# Patient Record
Sex: FEMALE | Race: BLACK OR AFRICAN AMERICAN
[De-identification: names, ages, dates, MRNs, and addresses within clinical notes are randomized per-mention and may not be internally consistent; named-entity substitution may affect disease eponyms.]

---

## 2022-11-22 ENCOUNTER — HOSPITAL ENCOUNTER (EMERGENCY)
Dept: HOSPITAL 97 - ER | Age: 47
Discharge: HOME | End: 2022-11-22
Payer: COMMERCIAL

## 2022-11-22 VITALS — OXYGEN SATURATION: 99 % | SYSTOLIC BLOOD PRESSURE: 144 MMHG | DIASTOLIC BLOOD PRESSURE: 110 MMHG | TEMPERATURE: 99 F

## 2022-11-22 DIAGNOSIS — S61.412A: Primary | ICD-10-CM

## 2022-11-22 PROCEDURE — 0JQK0ZZ REPAIR LEFT HAND SUBCUTANEOUS TISSUE AND FASCIA, OPEN APPROACH: ICD-10-PCS

## 2022-11-22 PROCEDURE — 12001 RPR S/N/AX/GEN/TRNK 2.5CM/<: CPT

## 2022-11-22 PROCEDURE — 99283 EMERGENCY DEPT VISIT LOW MDM: CPT

## 2022-11-22 NOTE — XMS REPORT
Continuity of Care Document

                          Created on:2022



Patient:TORO SMITH

Sex:Female

:1975

External Reference #:[id number]





Demographics







                          Address                   [address]



                                                    [address]



                                                    [place] [address]

 

                          Home Phone                [phone]

 

                          Work Phone                [phone]

 

                          Mobile Phone              [phone]

 

                          Email Address             NONE

 

                          Preferred Language        English

 

                          Marital Status            Unknown

 

                          Confucianism Affiliation     Unknown

 

                          Race                      Unknown

 

                          Additional Race(s)        Unavailable

 

                          Ethnic Group              Unknown









Author







                          Organization              [facility]

t

 

                          Address                   [address]  [address]



                                                    [place] [address]

 

                          Phone                     [phone]









Support







                Name            Relationship    Address         Phone

 

                EMPERATRIZ LEBLANC               [address] [phone]

[phone]



                                                [address]        



                                                [place] [address] 

 

                ASIF URIAS    [address]            [address]         [phone]



                                                [place], [place] 









Care Team Providers







                    Name                Role                Phone

 

                    ADAN BISWAS Primary Care Physician Unavailable

 

                    JAMES HESS Attending Clinician Unavailable

 

                    Only, Arnulfo Pob2 Test Attending Clinician Unavailable

 

                    James Hess DO Attending Clinician [phone]

 

                    Nurse, Adc Pob Immunization Attending Clinician Unavailable

 

                    Azra Kc Attending Clinician [phone]

 

                    AZRA GALVEZ Attending Clinician Unavailable

 

                    AZRA GALVEZ Admitting Clinician Unavailable









Payers







           Payer Name Policy Type Policy Number Effective Date Expiration Date S

ource

 

           [employer]            [id number] 2020            



                                            00:00:00              







Problems







       Condition Condition Condition Status Onset  Resolution Last   Treating Co

mments 

Source



       Name   Details Category        Date   Date   Treatment Clinician        



                                                 Date                 

 

       Other  Other  Disease Active 2016                             [facility]



       general general               2-06                               ity of



       counseling counseling               00:00:                             [place]

[place]



       and advice and advice               00                                 Me

dical



       for    for                                                     Branch



       contracept contracept                                                  



       santi    santi                                                     



       management management                                                  

 

       Elevated Elevated Disease Active 2016                             Unive

rs



       blood  blood                2-06                               ity of



       pressure pressure               00:00:                             [place]



       reading reading               00                                 Medical



       without without                                                  Branch



       diagnosis diagnosis                                                  



       of     of                                                      



       hypertensi hypertensi                                                  



       on     on                                                      

 

       Fibroids Fibroids Disease Active 2016                      Overview: Un

rox



                                   2-06                        Formattin ity of



                                   00:00:                      g of this [place]



                                   00                          note   Medical



                                                               might be Branch



                                                               different 



                                                               from the 



                                                               original. 



                                                               Reports 



                                                               diagnosed 



                                                               in , 



                                                               records 



                                                               requested 

 

       Anemia Anemia Disease Active                       Overview: Univer

s



                                                           Formattin ity of



                                   00:00:                      g of this [place]



                                   00                          note   Medical



                                                               might be Branch



                                                               different 



                                                               from the 



                                                               original. 



                                                               ICD10  



                                                               Diagnosis 



                                                               Term   



                                                               Replacer 



                                                               Utility 

 

       Well woman Well woman Disease Active                       Overview

: Univers



       exam   exam                                         Formattin ity of



                                   00:00:                      g of this [place]



                                   00                          note   Medical



                                                               might be Branch



                                                               different 



                                                               from the 



                                                               original. 



                                                               Pap    



                                                               smear- 



                                                               NIL with 



                                                               absent 



                                                               ECC.   



                                                               Repeat in 



                                                               1      



                                                               [id number] 



                                                               Diagnosis 



                                                               Term   



                                                               Replacer 



                                                               Utility 

 

       Morbid Morbid Disease Active                              [facility]



       obesity obesity                                              ity of



                                   00:00:                             [place]



                                   00                                 Medical



                                                                      Branch







Allergies, Adverse Reactions, Alerts







       Allergy Allergy Status Severity Reaction(s) Onset  Inactive Treating Comm

ents 

Source



       Name   Type                        Date   Date   Clinician        

 

       Codeine Propensi Active        Hives                        [facility]



              ty to                                               ity of



              adverse                      00:00:                      [place]



              reaction                      00                          Medical



              s                                                       Branch

 

       CODEINE DRUG   Active        Hives                        Univers



              INGREDI                                              ity of



                                          00:00:                      [place]



                                          00                          Medical



                                                                      Branch







Social History







           Social Habit Start Date Stop Date  Quantity   Comments   Source

 

           Exposure to                       Not sure              University 



           SARS-CoV-2                                             [place] [facility]



           (event)                                                Branch

 

           History SDOH                                             University o

f



           Alcohol Frequency                                             [facility]

edical



                                                                  Branch

 

           History SDOH                                             University o

f



           Alcohol Std                                             [place] [facility]



           Drinks                                                 Branch

 

           History SDOH                                             University o

f



           Alcohol Binge                                             [place] [facility]

al



                                                                  Branch

 

           Alcohol intake 2021 Current drinker of            Un

iversity of



                      00:00:00   00:00:00   alcohol (finding)            [facility]

edical



                                                                  Branch

 

           Tobacco use and 2014 Never used            Universit

y of



           exposure   00:00:00   00:00:00                         [facility]



                                                                  Branch

 

           Alcohol Comment 2014 occasionally            Univers

ity of



                      00:00:00   00:00:00                         [facility]

 

           Sex Assigned At [birth year]                       Universit

y of



           Birth      00:00:00   00:00:00                         [facility]









                Smoking Status  Start Date      Stop Date       Source

 

                Never smoker                                    [facility]







Medications







       Ordered Filled Start  Stop   Current Ordering Indication Dosage Frequency

 Signature

                    Comments            Components          Source



     Medication Medication Date Date Medication? Clinician                (SIG) 

          



     Name Name                                                   

 

     acetaminoph       No             1000mg      1,000 mg,         

  Univers



     en        3-04 03-03                          Oral,           ity of



     (TYLENOL)      00:45: 23:39                          ONCE, 1           [place]

s



     tablet      00   :00                           dose, Wed           Medical



     1,000 mg                                         3/3/21 at           Branch



                                                  1845,           



                                                  Routine           

 

     dexamethaso      2021- No             10mg      10 mg,           Uni

vers



     ne        3-04 03-03                          Intramuscu           ity of



     (DECADRON      00:30: 23:37                          lar, ONCE,           T

exas



     PHOSPHATE)      00   :00                           1 dose,           Medica

l



     injection                                         Wed 3/3/21           Bran

ch



     10 mg                                         at 1830,           



                                                  STAT           

 

     ketorolac      2021- No             30mg      30 mg,           Unive

rs



     (TORADOL)      3-03 03-03                          Intramuscu           ity

 of



     injection      23:30: 22:32                          lar, ONCE,           T

exas



     30 mg      00   :00                           1 dose,           Medical



                                                  Wed 3/3/21           Branch



                                                  at 1730,           



                                                  ASAP<br>Fa           



                                                  culty           



                                                  member           



                                                  approving           



                                                  Restricted           



                                                  medication           



                                                  :              



                                                  AZRA GALVEZ           

 

     traMADoL 50            Yes       4647 50mg      Take 1           Univ

ers



     mg tablet      3-03                               tablet by           ity o

f



               00:00:                               mouth           [place]



               00                                 every 6           Medical



                                                  (six)           Branch



                                                  hours as           



                                                  needed for           



                                                  Pain           



                                                  (scale           



                                                  7-10).           



                                                  Indication           



                                                  s: acute           



                                                  pain           

 

     ibuprofen            Yes       [id number] 800mg      Take 1           U

nivers



     800 mg      3-03                               tablet by           ity of



     tablet      00:00:                               mouth           [place]



               00                                 every 6           Medical



                                                  (six)           Branch



                                                  hours as           



                                                  needed for           



                                                  Pain           



                                                  (scale           



                                                  4-6).           

 

     traMADoL 50            Yes       4647 50mg      Take 1           Univ

ers



     mg tablet      3-03                               tablet by           ity o

f



               00:00:                               mouth           [place]



               00                                 every 6           Medical



                                                  (six)           Branch



                                                  hours as           



                                                  needed for           



                                                  Pain           



                                                  (scale           



                                                  7-10).           



                                                  Indication           



                                                  s: acute           



                                                  pain           

 

     ibuprofen            Yes       [id number] 800mg      Take 1           U

nivers



     800 mg      3-03                               tablet by           ity of



     tablet      00:00:                               mouth           [place]



               00                                 every 6           Medical



                                                  (six)           Branch



                                                  hours as           



                                                  needed for           



                                                  Pain           



                                                  (scale           



                                                  4-6).           

 

     traMADoL 50            Yes       4647 50mg      Take 1           Univ

ers



     mg tablet      3-03                               tablet by           ity o

f



               00:00:                               mouth           [place]



               00                                 every 6           Medical



                                                  (six)           Branch



                                                  hours as           



                                                  needed for           



                                                  Pain           



                                                  (scale           



                                                  7-10).           



                                                  Indication           



                                                  s: acute           



                                                  pain           

 

     ibuprofen            Yes       [id number] 800mg      Take 1           U

nivers



     800 mg      3-03                               tablet by           ity of



     tablet      00:00:                               mouth           [place]



               00                                 every 6           Medical



                                                  (six)           Branch



                                                  hours as           



                                                  needed for           



                                                  Pain           



                                                  (scale           



                                                  4-6).           

 

     NUVARING      2016      Yes       [id number] 1{each}      Insert 1        

   [facility]



     (NUVARING)      2-06                               Each into           ity 

of



     0.12-0.015      00:00:                               vagina           [place]



     mg/24 hr      00                                 once every           Medic

al



     vaginal                                         month.           Branch



     insert                                         Insert           



                                                  vaginally           



                                                  and leave           



                                                  in place           



                                                  for 3           



                                                  consecutiv           



                                                  e weeks,           



                                                  then           



                                                  remove for           



                                                  1 week.           

 

     NUVARING      2016      Yes       [id number] 1{each}      Insert 1        

   [facility]



     (NUVARING)      2-06                               Each into           ity 

of



     0.12-0.015      00:00:                               vagina           [place]



     mg/24 hr      00                                 once every           Medic

al



     vaginal                                         month.           Branch



     insert                                         Insert           



                                                  vaginally           



                                                  and leave           



                                                  in place           



                                                  for 3           



                                                  consecutiv           



                                                  e weeks,           



                                                  then           



                                                  remove for           



                                                  1 week.           

 

     NUVARING      2016      Yes       [id number] 1{each}      Insert 1        

   [facility]



     (NUVARING)      2-06                               Each into           ity 

of



     0.12-0.015      00:00:                               vagina           [place]



     mg/24 hr      00                                 once every           Medic

al



     vaginal                                         month.           Branch



     insert                                         Insert           



                                                  vaginally           



                                                  and leave           



                                                  in place           



                                                  for 3           



                                                  consecutiv           



                                                  e weeks,           



                                                  then           



                                                  remove for           



                                                  1 week.           

 

     MULTIVITS,C            Yes            1{tbl}      Take 1 Tab         

  Univers



     A,MINERALS/      1-30                               by mouth           ity 

of



     IRON/FA      16:45:                               daily.           [place]



     (ONE-A-DAY      12                                                Medical



     WOMENS                                                        Branch



     FORMULA                                                        



     ORAL)                                                        

 

     omega-3            Yes            1g        Take 1 g           Univer

s



     fatty      1-30                               by mouth           ity of



     acids-vitam      16:45:                               daily.           [place]

s



     in E (FISH      12                                                Medical



     OIL) 1,000                                                        Branch



     mg capsule                                                        

 

     MULTIVITS,C            Yes            1{tbl}      Take 1 Tab         

  Univers



     A,MINERALS/      1-30                               by mouth           ity 

of



     IRON/FA      10:45:                               daily.           [place]



     (ONE-A-DAY      12                                                Medical



     WOMENS                                                        Branch



     FORMULA                                                        



     ORAL)                                                        

 

     omega-3            Yes            1g        Take 1 g           Univer

s



     fatty      1-30                               by mouth           ity of



     acids-vitam      10:45:                               daily.           [place]

[place]



     in E (FISH      12                                                Medical



     OIL) 1,000                                                        [place]



     mg capsule                                                        

 

     MULTIVITS,C            Yes            1{tbl}      Take 1 Tab         

  Univers



     A,MINERALS/      1-30                               by mouth           ity 

of



     IRON/FA      10:45:                               daily.           [place]



     (ONE-A-DAY      12                                                Medical



     WOMENS                                                        [place]



     FORMULA                                                        



     ORAL)                                                        

 

     omega-3            Yes            1g        Take 1 g           Univer

s



     fatty                                     by mouth           ity of



     acids-vitam      10:45:                               daily.           [place]

[place]



     in E (FISH      12                                                Medical



     OIL) 1,000                                                        [place]



     mg capsule                                                        







Immunizations







           Ordered    Filled Immunization Date       Status     Comments   [facility]

e



           Immunization Name Name                                        

 

           SARS-COV-2 COVID-19            2022 Completed             Unive

rsity of



           BRANDON/J&J VACCINE            00:00:00                         [facility]

 

           SARS-COV-2 COVID-19            2022 Completed             Unive

rsity of



           BRANDON/J&J VACCINE            00:00:00                         [facility]

 

           SARS-COV-2 COVID-19            2021 Completed             Unive

rsity of



           BRANDON/J&J VACCINE            00:00:00                         [facility]                  2014 Completed             [facility]



                                 00:00:00                         [facility] [place]                  2014 Completed             [facility]



                                 00:00:00                         [facility] [place]                  2014 Completed             [facility]



                                 00:00:00                         [facility]

 

           Td                    2003 Completed             [facility]



                                 00:00:00                         [facility]

 

           Td                    2003 Completed             [facility]



                                 00:00:00                         [facility]

 

           Td                    2003 Completed             [facility]



                                 00:00:00                         [facility]







Vital Signs







             Vital Name   Observation Time Observation Value Comments     Source

 

             Systolic blood 2021 00:06:46 142 mm[Hg]                Univer

sity of



             pressure                                            [facility]

 

             Diastolic blood 2021 00:06:46 94 mm[Hg]                 Unive

rsity of



             pressure                                            [facility]

 

             Heart rate   2021 00:05:40 80 /min                   [facility]

 

             Respiratory rate 2021 00:05:40 16 /min                   [facility]

 

             Oxygen saturation in 2021 00:05:40 99 /min                   

[facility]



             Arterial blood by                                        [place] [facility]

[place]



             Pulse oximetry                                        [place]

 

             Body temperature 2021 21:56:00 37 Zara                    [facility]

 

             Body weight  2021 21:56:00 99.791 kg                 [facility]

 

             BMI          2021 21:56:00 36.61 kg/m2               [facility]







Procedures







                Procedure       Date / Time Performed Performing Clinician Jason

e

 

                SARS-COV-2 COVID-19 2022 21:34:19 Doctor Unassigned, No Un

iversity of 

[place]



                VACCINE,0.5ML,IM                 Name            [facility]



                (BRANDON/J&J)                                   

 

                XR HIPS 2 VW LEFT 2021 23:00:50 Azra Galvez Univer

[facility]

 

                URINALYSIS      2021 22:28:00 Azra Galvez [facility]







Encounters







        Start   End     Encounter Admission Attending Care    Care    Encounter 

Source



        Date/Time Date/Time Type    Type    Clinicians Facility Department ID   

   

 

        2022 Outpatient ALCON HESS  [facility]    [id number]

[id number] Univers



        16:30:00 16:55:17                 JAMES boateng [facility]

 

        2022 Laboratory         Only, Arnulfo Pob2 Test [facility]    1.2

.840.114 [id number] 

Univers



        16:30:00 16:45:00 Only            James Hess 350.1.13

.10         ity [facility]



                                                [facility] [id number]         [place]

[place]



                                                PROFESSIO [phone]         Me

dical



                                                NAL     [address]             [facility]                 

 

        2022 Imm/Inj         Nurse, Arnulfo Pob Immunization [facility]  

  [id number] 

[id number]                                Univers



        15:40:00 15:40:00 Visit           James Hess 350.1.13

.10         ity [facility]



                                                [facility] [id number]         [place]

[place]



                                                PROFESSIO [phone]         Me

dical



                                                NAL     421             [facility]                 

 

        2022 Outpatient ALCON HESS  [facility]    [id number]

115 Univers



        15:40:00 15:31:14                 JAMES boateng [facility]

 

        2021 Emergency         Duglas [facility]    1.2.840.114 [id number]

[id number] Univers



        15:58:00 18:12:00                 Azra Preciado 350.1.13.10        

 ity of



                                                [place] [id number]         [facility]  [phone]         Medi

[place]



                                                        [address]             [place]anch

 

        2021 Emergency X       SANTI GALVEZ    ERT     [id number]

[id number] Univers



        15:58:00 18:12:00                 AZRA boateng [facility]







Results







           Test       Test       Test       Results    Result     Source



           Description Time       Comments              Comments   

 

           XR HIPS 2 VW 2021-              Impression: No evidence           

 University [place] [id number]                    of acute fracture or            [facility]



                      23:33:55              dislocation. RL: [id number]            [facility]



                                            Electronically signed by            



                                            Jeremiah Doan MD at            



                                            3/3/2021 5:33 PMClinical            



                                            indication: Left hip            



                                            pain. Ordering            



                                            Physician: AZRA GALVEZ Technique: The            



                                            frontal view the pelvis            



                                            and 2 additional views            



                                            of the lefthip were            



                                            obtained. ?There are no            



                                            prior pelvic or left hip            



                                            imaging studiesavailable            



                                            for comparison.            



                                            Findings: There is no            



                                            evidence of an acute            



                                            fracture or            



                                            dislocationinvolving the            



                                            bones of the pelvis with            



                                            particular attention to            



                                            the lefthip. ?There is            



                                            no radiopaque soft            



                                            tissue foreign body.            



                                            [facility], Radiant Results            



                                            Inft User - 2021            



                                            5:34 PM CSTClinical            



                                            indication: Left hip            



                                            pain.Ordering Physician:            



                                            AZRA FOLWERLETechnique: The            



                                            frontal view the pelvis            



                                            and 2 additional views            



                                            of the lefthip were            



                                            obtained. There are no            



                                            prior pelvic or left hip            



                                            imaging studiesavailable            



                                            for comparison.Findings:            



                                            There is no evidence of            



                                            an acute fracture or            



                                            dislocationinvolving the            



                                            bones of the pelvis with            



                                            particular attention to            



                                            the lefthip. There is no            



                                            radiopaque soft tissue            



                                            foreign               



                                            body.IMPRESSIONImpressio            



                                            n:No evidence of acute            



                                            fracture or            



                                            dislocation.RL:            



                                            4507Electronically            



                                            signed by Jeremiah Doan MD at 3/3/2021 5:33 PM            









                    URINALYSIS          2021 22:52:00 









                      Test Item  Value      Reference Range Interpretation Comme

nts









             APPEARANCE (test code = Clear        Clear                     



             5979771125)                                         

 

             COLOR (test code = 6477747306) Yellow       Yellow                 

   

 

             PH (test code = 9943325384)              4.8-8.0                   

 

             SP GRAVITY (test code =              1.003-1.030  H            



             6238974645)                                         

 

             GLU U QUAL (test code = Normal       Normal                    



             8060600656)                                         

 

             BLOOD (test code = 6394277340) Negative     Negative               

   

 

             KETONES (test code = 5983050323) Negative     Negative             

     

 

             PROTEIN (test code = 2887-8) 30 mg/dL     Negative     A           

 

 

             UROBILIN (test code = 4.0 mg/dL    Normal       A            



             5302173805)                                         

 

             BILIRUBIN (test code = Negative     Negative                  



             2459143182)                                         

 

             NITRITE (test code = 8823337470) Negative     Negative             

     

 

             LEUK ESTEFANÍA (test code = Negative     Negative                  



             3268255796)                                         

 

             RBC/HPF (test code = 6342189859)              See_Comment  H       

      [Automated message] The



                                                                 system which ge

nerated this



                                                                 result transmit

shanelle reference



                                                                 range: 0 - 3 HP

F. The



                                                                 reference range

 was not used



                                                                 to interpret th

is result as



                                                                 normal/abnormal

.

 

             WBC/HPF (test code = 3891771691)              See_Comment          

      [Automated message] The



                                                                 system which ge

nerated this



                                                                 result transmit

shanelle reference



                                                                 range: 0 - 5 HP

F. The



                                                                 reference range

 was not used



                                                                 to interpret th

is result as



                                                                 normal/abnormal

.

 

             BACTERIA (test code = Negative     Negative                  



             2687657498)                                         

 

             MUCOUS (test code = 7080050020) Moderate     Negative LPF A        

    

 

             AMORPHOUS (test code = Rare         Rare HPF                  



             1952978443)                                         

 

             SQ EPITH (test code =              HPF                       



             7029766929)                                         

 

             Lab Interpretation (test code = Abnormal                           

    



             81749-5)                                            



[facility]

## 2022-11-22 NOTE — ER
Nurse's Notes                                                                                     

 [facility]                                                                 

Name: Chiquis Newberry                                                                              

Age: 47 yrs                                                                                       

Sex: Female                                                                                       

: 1975                                                                                   

MRN: [id number]                                                                                   

Arrival Date: 2022                                                                          

Time: 20:41                                                                                       

Account#: [id number]                                                                            

Bed 6                                                                                             

Private MD:                                                                                       

Diagnosis: Laceration without foreign body of left hand, initial encounter                        

                                                                                                  

Presentation:                                                                                     

                                                                                             

20:56 Chief complaint: Patient states: Accidentally cut her left hand with a knife while      kb3 

      cooking approximately 45 minutes. Coronavirus screen: Vaccine status: Patient reports       

      receiving the 2nd dose of the covid vaccine. Client denies travel out of the [place] in        

      the last 14 days. Ebola Screen: Patient negative for fever greater than or equal to         

      101.5 degrees Fahrenheit, and additional compatible Ebola Virus Disease symptoms            

      Patient denies exposure to infectious person. Patient denies travel to an                   

      Ebola-affected area in the 21 days before illness onset. Complicating Factors: There        

      are no complicating factors for this patient. Initial Sepsis Screen: Does the patient       

      meet any 2 criteria? No. Patient's initial sepsis screen is negative. Does the patient      

      have a suspected source of infection? No. Patient's initial sepsis screen is negative.      

      Risk Assessment: Do you want to hurt yourself or someone else? Patient reports no           

      desire to harm self or others. Onset of symptoms was 2022 at 20:15.            

20:56 Method Of Arrival: Ambulatory                                                           kb3 

20:56 Acuity: OMKAR 4                                                                           kb3 

                                                                                                  

Triage Assessment:                                                                                

20:58 General: Appears in no apparent distress. Behavior is calm, cooperative. Pain:          kb3 

      Complains of pain in lateral aspect of left hand Pain does not radiate. Pain currently      

      is 3 out of 10 on a pain scale. Injury Description: Laceration sustained to lateral         

      aspect of left hand.                                                                        

                                                                                                  

OB/GYN:                                                                                           

20:58 LMP N/A - Post-menopause                                                                kb3 

                                                                                                  

Historical:                                                                                       

- Allergies:                                                                                      

20:58 No Known Allergies;                                                                     kb3 

- Home Meds:                                                                                      

20:58 meloxicam 15 mg oral tab 1 tab once daily [Active]; tizanidine 2 mg oral cap 1 cap      kb3 

      every 8 hours [Active];                                                                     

- PMHx:                                                                                           

20:58 Chronic back pain;                                                                      kb3 

- PSHx:                                                                                           

20:58 None;                                                                                   kb3 

                                                                                                  

- Immunization history:: Adult Immunizations up to date, Client reports receiving the             

  2nd dose of the Covid vaccine, Last tetanus immunization: up to date.                           

- Social history:: Smoking status: Patient denies any tobacco usage or history of.                

                                                                                                  

                                                                                                  

Screenin:18 Abuse screen: Denies threats or abuse. Denies injuries from another. Nutritional        ll3 

      screening: No deficits noted. Tuberculosis screening: No symptoms or risk factors           

      identified. Fall Risk None identified.                                                      

                                                                                                  

Vital Signs:                                                                                      

20:56  / 110; Pulse 80; Resp 20; Temp 99; Pulse Ox 99% ; Weight 77.11 kg; Height 5 ft.  kb3 

      6 in. (167.64 cm); Pain 3/10;                                                               

20:56 Body Mass Index 27.44 (77.11 kg, 167.64 cm)                                             kb3 

                                                                                                  

ED Course:                                                                                        

20:41 Patient arrived in ED.                                                                  dt4 

20:55 Victorino Charles PA is PHCP.                                                                cp  

20:55 Victorino Jacobs MD is Attending Physician.                                             cp  

20:58 Triage completed.                                                                       kb3 

20:58 Arm band placed on right wrist.                                                         kb3 

21:06 Elvira Batista RN is Primary Nurse.                                                  vc1 

22:18 Patient has correct armband on for positive identification. Bed in low position. Call   ll3 

      light in reach. Side rails up X 1.                                                          

22:18 Assist provider with laceration repair on lateral aspect of left hand that was 2.5 cm.  ll3 

      or less using sutures. Set up tray. Performed by Victorino SANCHEZ Dressed with 4X4s,           

      Adaptic, Kerlix, Patient tolerated well. IV discontinued, intact, bleeding controlled,      

      No redness/swelling at site. Pressure dressing applied.                                     

                                                                                                  

Administered Medications:                                                                         

22:05 Drug: Lidocaine (1 %) 5 ml {Note: Administered by LAURA Christensen.} Volume: 5 ml; Route: ll3 

      Infiltration;                                                                               

22:19 Follow up: Response: No adverse reaction                                                ll3 

                                                                                                  

                                                                                                  

Medication:                                                                                       

22:19 VIS not applicable for this client.                                                     ll3 

                                                                                                  

Outcome:                                                                                          

22:02 Discharge ordered by MD.                                                                cp  

22:18 Discharged to home ambulatory.                                                          ll3 

22:18 Condition: stable                                                                           

22:18 Discharge instructions given to patient, Instructed on discharge instructions, follow       

      up and referral plans. Demonstrated understanding of instructions, follow-up care.          

22:19 Patient left the ED.                                                                    ll3 

                                                                                                  

Signatures:                                                                                       

Victorino Charles PA PA cp Loubet, Lynsea, RN RN   ll3                                                  

Calcote, Elvira, RN                    RN   vc1                                                  

Yadira Fine, RN                    RN   kb3                                                  

Erin Juarez4                                                  

                                                                                                  

**************************************************************************************************

## 2022-11-22 NOTE — EDPHYS
Physician Documentation                                                                           

 [facility]                                                                 

Name: Chiquis Newberry                                                                              

Age: 47 yrs                                                                                       

Sex: Female                                                                                       

: 1975                                                                                   

MRN: [id number]                                                                                   

Arrival Date: 2022                                                                          

Time: 20:41                                                                                       

Account#: [id number]                                                                            

Bed 6                                                                                             

Private MD:                                                                                       

Victorino Ramirez                                                                      

HPI:                                                                                              

                                                                                             

21:20 This 47 yrs old Black Female presents to ER via Ambulatory with complaints of           cp  

      Laceration To Hand.                                                                         

21:20 The patient has a laceration occurred at home, and there are no complicating factors.   cp  

      using knife while cooking. The laceration(s) is(are) located on the dorsal side             

      metacarpal head of left index finger. Onset: The symptoms/episode began/occurred just       

      prior to arrival. Associated signs and symptoms: The patient has no apparent associated     

      signs or symptoms.                                                                          

                                                                                                  

OB/GYN:                                                                                           

20:58 LMP N/A - Post-menopause                                                                kb3 

                                                                                                  

Historical:                                                                                       

- Allergies:                                                                                      

20:58 No Known Allergies;                                                                     kb3 

- Home Meds:                                                                                      

20:58 meloxicam 15 mg oral tab 1 tab once daily [Active]; tizanidine 2 mg oral cap 1 cap      kb3 

      every 8 hours [Active];                                                                     

- PMHx:                                                                                           

20:58 Chronic back pain;                                                                      kb3 

- PSHx:                                                                                           

20:58 None;                                                                                   kb3 

                                                                                                  

- Immunization history:: Adult Immunizations up to date, Client reports receiving the             

  2nd dose of the Covid vaccine, Last tetanus immunization: up to date.                           

- Social history:: Smoking status: Patient denies any tobacco usage or history of.                

                                                                                                  

                                                                                                  

ROS:                                                                                              

21:25 Constitutional: Negative for body aches, chills, fever.                                 cp  

21:25 Skin: Positive for laceration(s), of the dorsal side of left hand.                          

21:25 Neuro: Negative for numbness, tingling.                                                     

21:25 All other systems are negative.                                                             

                                                                                                  

Exam:                                                                                             

21:30 Constitutional: The patient appears in no acute distress, alert, awake, comfortable,    cp  

      non-toxic, well developed, well nourished.                                                  

21:30 Musculoskeletal/extremity: Extremities: grossly normal except: noted in the dorsal side cp  

      of left hand: laceration, ROM: full active range of motion, in the left index finger,       

      Perfusion: the extremity is normally perfused throughout, Sensation intact. Tendon          

      exam: specific tendon testing normal through active and passive range of motion             

21:30 Skin: injury, laceration(s), the wound is approximately  3 cm(s), of the dorsal side        

      metacarpal head left index finger, that can be described as clean, no foreign body,         

      linear, with mild bleeding.                                                                 

                                                                                                  

Vital Signs:                                                                                      

20:56  / 110; Pulse 80; Resp 20; Temp 99; Pulse Ox 99% ; Weight 77.11 kg; Height 5 ft.  kb3 

      6 in. (167.64 cm); Pain 3/10;                                                               

20:56 Body Mass Index 27.44 (77.11 kg, 167.64 cm)                                             kb3 

                                                                                                  

Laceration:                                                                                       

21:55 Wound Repair of 3cm ( 1.2in ) subcutaneous laceration to dorsal aspect metacarpal head  cp  

      of left index finger. Linear shaped.. Distal neuro/vascular/tendon intact. Anesthesia:      

      Wound infiltrated with 4 mls of 1% lidocaine. Wound prep: Moderate cleansing by me,         

      Wound irrigation by me. Skin closed with 4 4-0 Prolene using interrupted sutures and        

      sterile technique. Dressed with 4x4's. Patient tolerated well.                              

                                                                                                  

MDM:                                                                                              

21:04 Patient medically screened.                                                             cp  

22:02 Data reviewed: vital signs, nurses notes, and as a result, I will discharge patient.    cp  

22:02 Differential diagnosis: superficial laceration, tendon injury, vascular injury, open    cp  

      fracture. Counseling: I had a detailed discussion with the patient and/or guardian          

      regarding: the historical points, exam findings, and any diagnostic results supporting      

      the discharge/admit diagnosis, to return to the emergency department if symptoms worsen     

      or persist or if there are any questions or concerns that arise at home. Response to        

      treatment: the patient's symptoms have markedly improved after treatment, and as a          

      result, I will discharge patient.                                                           

                                                                                                  

                                                                                             

21:16 Order name: Dressing - Wound; Complete Time: 21:19                                      cp  

                                                                                             

21:16 Order name: Gloves, Sterile; Complete Time: 21:19                                       cp  

                                                                                             

21:16 Order name: Setup Suture Tray; Complete Time: 21:19                                     cp  

                                                                                             

21:16 Order name: Wound Care; Complete Time: 21:19                                            cp  

                                                                                             

21:55 Order name: Wound dressing; Complete Time: 22:18                                        cp  

                                                                                                  

Administered Medications:                                                                         

22:05 Drug: Lidocaine (1 %) 5 ml {Note: Administered by LAURA Christensen.} Volume: 5 ml; Route: ll3 

      Infiltration;                                                                               

22:19 Follow up: Response: No adverse reaction                                                ll3 

                                                                                                  

                                                                                                  

Disposition Summary:                                                                              

22 22:02                                                                                    

Discharge Ordered                                                                                 

      Location: Home                                                                          cp  

      Problem: new                                                                            cp  

      Symptoms: have improved                                                                 cp  

      Condition: Stable                                                                       cp  

      Diagnosis                                                                                   

        - Laceration without foreign body of left hand, initial encounter                     cp  

      Followup:                                                                               cp  

        - With: Private Physician                                                                  

        - When: 7 - 10 days                                                                        

        - Reason: Staple/Suture removal                                                            

      Discharge Instructions:                                                                     

        - Discharge Summary Sheet                                                             cp  

        - Laceration Care, Adult                                                              cp  

        - Sutured Wound Care                                                                  cp  

      Forms:                                                                                      

        - Medication Reconciliation Form                                                      cp  

        - Thank You Letter                                                                    cp  

        - Antibiotic Education                                                                cp  

        - Prescription Opioid Use                                                             cp  

Signatures:                                                                                       

Victorino Charles PA PA cp Loubet, Lynsea, RN                      RN   ll3                                                  

Yadira Fine RN                    RN   kb3                                                  

                                                                                                  

**************************************************************************************************

## 2022-12-02 ENCOUNTER — HOSPITAL ENCOUNTER (EMERGENCY)
Dept: HOSPITAL 97 - ER | Age: 47
Discharge: HOME | End: 2022-12-02
Payer: COMMERCIAL

## 2022-12-02 VITALS — OXYGEN SATURATION: 96 % | TEMPERATURE: 98 F | SYSTOLIC BLOOD PRESSURE: 138 MMHG | DIASTOLIC BLOOD PRESSURE: 95 MMHG

## 2022-12-02 DIAGNOSIS — Z48.02: Primary | ICD-10-CM

## 2022-12-02 PROCEDURE — 99283 EMERGENCY DEPT VISIT LOW MDM: CPT

## 2022-12-02 NOTE — XMS REPORT
Continuity of Care Document

                           Created on:2022



Patient:TORO SMITH

Sex:Female

:1975

External Reference #:[id number]





Demographics







                          Address                   [address]



                                                    [address]



                                                    [place] [address]

 

                          Home Phone                [phone]

 

                          Work Phone                [phone]

 

                          Mobile Phone              [phone]

 

                          Email Address             [email or web address]

 

                          Preferred Language        English

 

                          Marital Status            Unknown

 

                          Gnosticist Affiliation     Unknown

 

                          Race                      Unknown

 

                          Additional Race(s)        Unavailable

 

                          Ethnic Group              Unknown









Author







                          Organization              [facility]

t

 

                          Address                   [address]



                                                    [place] [address]

 

                          Phone                     [phone]









Support







                Name            Relationship    Address         Phone

 

                EMPERATRIZ LEBLANC               [address] [phone]

[phone]



                                                [address]        



                                                [place] [address] 

 

                ASIF URIAS    [address]            [address]         [phone]



                                                [place], [place] 









Care Team Providers







                    Name                Role                Phone

 

                    ADAN BISWAS Primary Care Physician Unavailable

 

                    JAMES HESS Attending Clinician Unavailable

 

                    Only, Arnulfo Pob2 Test Attending Clinician Unavailable

 

                    James Hess DO Attending Clinician [phone]

 

                    Nurse, Arnulfo Pob Immunization Attending Clinician Unavailable

 

                    Azra Kc Attending Clinician [phone]

 

                    AZRA GALVEZ Attending Clinician Unavailable

 

                    AZRA GALVEZ Admitting Clinician Unavailable









Payers







           Payer Name Policy Type Policy Number Effective Date Expiration Date S

ource

 

           [facility]            [id number] 2020            



                                            00:00:00              







Problems







       Condition Condition Condition Status Onset  Resolution Last   Treating Co

mments 

Source



       Name   Details Category        Date   Date   Treatment Clinician        



                                                 Date                 

 

       Other  Other  Disease Active 2016                             [facility]



       general general               2-06                               ity of



       counseling counseling               00:00:                             [place]

[place]



       and advice and advice               00                                 Me

dical



       for    for                                                     Branch



       contracept contracept                                                  



       santi    santi                                                     



       management management                                                  

 

       Elevated Elevated Disease Active 2016                             Unive

rs



       blood  blood                2-06                               ity of



       pressure pressure               00:00:                             [place]



       reading reading               00                                 Medical



       without without                                                  Branch



       diagnosis diagnosis                                                  



       of     of                                                      



       hypertensi hypertensi                                                  



       on     on                                                      

 

       Fibroids Fibroids Disease Active 2016                      Overview: Un

rox



                                   2-06                        Formattin ity of



                                   00:00:                      g of this [place]



                                   00                          note   Medical



                                                               might be Branch



                                                               different 



                                                               from the 



                                                               original. 



                                                               Reports 



                                                               diagnosed 



                                                               in , 



                                                               records 



                                                               requested 

 

       Anemia Anemia Disease Active                       Overview: Univer

s



                                                           Formattin ity of



                                   00:00:                      g of this [place]



                                   00                          note   Medical



                                                               might be Branch



                                                               different 



                                                               from the 



                                                               original. 



                                                               ICD10  



                                                               Diagnosis 



                                                               Term   



                                                               Replacer 



                                                               Utility 

 

       Well woman Well woman Disease Active                       Overview

: Univers



       exam   exam                                         Formattin ity of



                                   00:00:                      g of this [place]



                                   00                          note   Medical



                                                               might be Branch



                                                               different 



                                                               from the 



                                                               original. 



                                                               Pap    



                                                               smear- 



                                                               NIL with 



                                                               absent 



                                                               ECC.   



                                                               Repeat in 



                                                               1      



                                                               [id number] 



                                                               Diagnosis 



                                                               Term   



                                                               Replacer 



                                                               Utility 

 

       Morbid Morbid Disease Active                              [facility]



       obesity obesity                                              ity of



                                   00:00:                             [place]



                                   00                                 Medical



                                                                      Branch







Allergies, Adverse Reactions, Alerts







       Allergy Allergy Status Severity Reaction(s) Onset  Inactive Treating Comm

ents 

Source



       Name   Type                        Date   Date   Clinician        

 

       Codeine Propensi Active        Hives                        [facility]



              ty to                                               ity of



              adverse                      00:00:                      [place]



              reaction                      00                          Medical



              s                                                       Branch

 

       CODEINE DRUG   Active        Hives                        Univers



              INGREDI                                              ity of



                                          00:00:                      [place]



                                          00                          Medical



                                                                      Branch







Social History







           Social Habit Start Date Stop Date  Quantity   Comments   Source

 

           Exposure to                       Not sure              [facility]



           SARS-CoV-2                                             [place] [facility]



           (event)                                                Branch

 

           History SDOH                                             University o

f



           Alcohol Frequency                                             [facility]

edical



                                                                  Branch

 

           History SDOH                                             University o

f



           Alcohol Std                                             [place] [facility]



           Drinks                                                 Branch

 

           History [facility]                                             University o

f



           Alcohol Binge                                             [place] [facility]

al



                                                                  Branch

 

           Alcohol intake 2021 Current drinker of            Un

iversity of



                      00:00:00   00:00:00   alcohol (finding)            [facility]

edical



                                                                  Branch

 

           Tobacco use and 2014 Never used            Universit

y of



           exposure   00:00:00   00:00:00                         [facility]



                                                                  Branch

 

           Alcohol Comment 2014 occasionally            Univers

ity of



                      00:00:00   00:00:00                         [facility]



                                                                  Branch

 

           Sex Assigned At [birth year]                       Universit

y of



           Birth      00:00:00   00:00:00                         [facility]



                                                                  Branch









                Smoking Status  Start Date      Stop Date       Source

 

                Never smoker                                    [facility]







Medications







       Ordered Filled Start  Stop   Current Ordering Indication Dosage Frequency

 Signature

                    Comments            Components          Source



     Medication Medication Date Date Medication? Clinician                (SIG) 

          



     Name Name                                                   

 

     acetaminoph      2021- No             1000mg      1,000 mg,         

  Univers



     en        3-04 03-03                          Oral,           ity of



     (TYLENOL)      00:45: 23:39                          ONCE, 1           [place]

s



     tablet      00   :00                           dose, Wed           Medical



     1,000 mg                                         3/3/21 at           Branch



                                                  1845,           



                                                  Routine           

 

     dexamethaso      2021- No             10mg      10 mg,           Uni

vers



     ne        3-04 03-03                          Intramuscu           ity of



     (DECADRON      00:30: 23:37                          lar, ONCE,           T

exas



     PHOSPHATE)      00   :00                           1 dose,           Medica

l



     injection                                         Wed 3/3/21           Bran

ch



     10 mg                                         at 1830,           



                                                  STAT           

 

     ketorolac      2021- No             30mg      30 mg,           Unive

rs



     (TORADOL)      3-03 03-03                          Intramuscu           ity

 of



     injection      23:30: 22:32                          lar, ONCE,           T

exas



     30 mg      00   :00                           1 dose,           Medical



                                                  Wed 3/3/21           Branch



                                                  at 1730,           



                                                  ASAP<br>Fa           



                                                  culty           



                                                  member           



                                                  approving           



                                                  Restricted           



                                                  medication           



                                                  :              



                                                  AZRA GALVEZ           

 

     traMADoL 50            Yes       4647 50mg      Take 1           Univ

ers



     mg tablet      3-03                               tablet by           ity o

f



               00:00:                               mouth           [place]



               00                                 every 6           Medical



                                                  (six)           Branch



                                                  hours as           



                                                  needed for           



                                                  Pain           



                                                  (scale           



                                                  7-10).           



                                                  Indication           



                                                  s: acute           



                                                  pain           

 

     ibuprofen            Yes       [id number] 800mg      Take 1           U

nivers



     800 mg      3-03                               tablet by           ity of



     tablet      00:00:                               mouth           [place]



               00                                 every 6           Medical



                                                  (six)           Branch



                                                  hours as           



                                                  needed for           



                                                  Pain           



                                                  (scale           



                                                  4-6).           

 

     traMADoL 50            Yes       4647 50mg      Take 1           Univ

ers



     mg tablet      3-03                               tablet by           ity o

f



               00:00:                               mouth           [place]



               00                                 every 6           Medical



                                                  (six)           Branch



                                                  hours as           



                                                  needed for           



                                                  Pain           



                                                  (scale           



                                                  7-10).           



                                                  Indication           



                                                  s: acute           



                                                  pain           

 

     ibuprofen            Yes       [id number] 800mg      Take 1           U

nivers



     800 mg      3-03                               tablet by           ity of



     tablet      00:00:                               mouth           [place]



               00                                 every 6           Medical



                                                  (six)           Branch



                                                  hours as           



                                                  needed for           



                                                  Pain           



                                                  (scale           



                                                  4-6).           

 

     traMADoL 50            Yes       4647 50mg      Take 1           Univ

ers



     mg tablet      3-03                               tablet by           ity o

f



               00:00:                               mouth           [place]



               00                                 every 6           Medical



                                                  (six)           Branch



                                                  hours as           



                                                  needed for           



                                                  Pain           



                                                  (scale           



                                                  7-10).           



                                                  Indication           



                                                  s: acute           



                                                  pain           

 

     ibuprofen            Yes       [id number] 800mg      Take 1           U

nivers



     800 mg      3-03                               tablet by           ity of



     tablet      00:00:                               mouth           [place]



               00                                 every 6           Medical



                                                  (six)           Branch



                                                  hours as           



                                                  needed for           



                                                  Pain           



                                                  (scale           



                                                  4-6).           

 

     NUVARING      2016      Yes       [id number] 1{each}      Insert 1        

   Univers



     (NUVARING)      2-06                               Each into           ity 

of



     0.12-0.015      00:00:                               vagina           [place]



     mg/24 hr      00                                 once every           Medic

al



     vaginal                                         month.           Branch



     insert                                         Insert           



                                                  vaginally           



                                                  and leave           



                                                  in place           



                                                  for 3           



                                                  consecutiv           



                                                  e weeks,           



                                                  then           



                                                  remove for           



                                                  1 week.           

 

     NUVARING      2016      Yes       [id number] 1{each}      Insert 1        

   [facility]



     (NUVARING)      2-06                               Each into           ity 

of



     0.12-0.015      00:00:                               vagina           [place]



     mg/24 hr      00                                 once every           Medic

al



     vaginal                                         month.           Branch



     insert                                         Insert           



                                                  vaginally           



                                                  and leave           



                                                  in place           



                                                  for 3           



                                                  consecutiv           



                                                  e weeks,           



                                                  then           



                                                  remove for           



                                                  1 week.           

 

     NUVARING      2016      Yes       [id number] 1{each}      Insert 1        

   Univers



     (NUVARING)      2-06                               Each into           ity 

of



     0.12-0.015      00:00:                               vagina           [place]



     mg/24 hr      00                                 once every           Medic

al



     vaginal                                         month.           Branch



     insert                                         Insert           



                                                  vaginally           



                                                  and leave           



                                                  in place           



                                                  for 3           



                                                  consecutiv           



                                                  e weeks,           



                                                  then           



                                                  remove for           



                                                  1 week.           

 

     MULTIVITS,C            Yes            1{tbl}      Take 1 Tab         

  Univers



     A,MINERALS/      1-30                               by mouth           ity 

of



     IRON/FA      16:45:                               daily.           [place]



     (ONE-A-DAY      12                                                Medical



     WOMENS                                                        Branch



     FORMULA                                                        



     ORAL)                                                        

 

     omega-3            Yes            1g        Take 1 g           Univer

s



     fatty      1-30                               by mouth           ity of



     acids-vitam      16:45:                               daily.           [place]

s



     in E (FISH      12                                                Medical



     OIL) 1,000                                                        Branch



     mg capsule                                                        

 

     MULTIVITS,C            Yes            1{tbl}      Take 1 Tab         

  Univers



     A,MINERALS/      1-30                               by mouth           ity 

of



     IRON/FA      10:45:                               daily.           [place]



     (ONE-A-DAY      12                                                Medical



     WOMENS                                                        Branch



     FORMULA                                                        



     ORAL)                                                        

 

     omega-3            Yes            1g        Take 1 g           Univer

s



     fatty      1-30                               by mouth           ity of



     acids-vitam      10:45:                               daily.           [place]

s



     in E (FISH      12                                                Medical



     OIL) 1,000                                                        Branch



     mg capsule                                                        

 

     MULTIVITS,C      2014-0      Yes            1{tbl}      Take 1 Tab         

  Univers



     A,MINERALS/      1-30                               by mouth           ity 

of



     IRON/FA      10:45:                               daily.           [place]



     (ONE-A-DAY      12                                                Medical



     WOMENS                                                        [place]



     FORMULA                                                        



     ORAL)                                                        

 

     omega-3            Yes            1g        Take 1 g           Univer

s



     fatty      30                               by mouth           ity of



     acids-vitam      10:45:                               daily.           [place]

[place]



     in E (FISH      12                                                Medical



     OIL) 1,000                                                        Branch



     mg capsule                                                        







Immunizations







           Ordered    Filled Immunization Date       Status     Comments   [facility]

e



           Immunization Name Name                                        

 

           SARS-COV-2 COVID-19            2022 Completed             Unive

rsity of



           BRANDON/J&J VACCINE            00:00:00                         [facility]

 

           SARS-COV-2 COVID-19            2022 Completed             Unive

rsity of



           BRANDON/J&J VACCINE            00:00:00                         [facility]

 

           SARS-COV-2 COVID-19            2021 Completed             Unive

rsity of



           BRANDON/J&J VACCINE            00:00:00                         [facility]                  2014 Completed             [facility]



                                 00:00:00                         [facility]                  2014 Completed             [facility]



                                 00:00:00                         [facility]                  2014 Completed             [facility]



                                 00:00:00                         [facility]

 

           Td                    2003 Completed             [facility]



                                 00:00:00                         [facility]

 

           Td                    2003 Completed             [facility]



                                 00:00:00                         [facility]

 

           Td                    2003 Completed             [facility]



                                 00:00:00                         [facility]







Vital Signs







             Vital Name   Observation Time Observation Value Comments     Source

 

             Systolic blood 2021 00:06:46 142 mm[Hg]                Univer

sity of



             pressure                                            [facility]

 

             Diastolic blood 2021 00:06:46 94 mm[Hg]                 Unive

rsity of



             pressure                                            [facility]

 

             Heart rate   2021 00:05:40 80 /min                   [facility]

 

             Respiratory rate 2021 00:05:40 16 /min                   [facility]

 

             Oxygen saturation in 2021 00:05:40 99 /min                   

[facility]



             Arterial blood by                                        [place] [facility]

[place]



             Pulse oximetry                                        [place]

 

             Body temperature 2021 21:56:00 37 Zara                    [facility]

 

             Body weight  2021 21:56:00 99.791 kg                 [facility]

 

             BMI          2021 21:56:00 36.61 kg/m2               [facility]







Procedures







                Procedure       Date / Time Performed Performing Clinician Jason portillo

 

                SARS-COV-2 COVID-19 2022 21:34:19 Doctor Unassigned, No Un

iversity of 

[place]



                VACCINE,0.5ML,IM                 Name            [facility]



                (BRANDON/J&J)                                   

 

                XR HIPS 2 VW LEFT 2021 23:00:50 Azra Galvez [facility]

 

                URINALYSIS      2021 22:28:00 Azra Galvez [facility]







Encounters







        Start   End     Encounter Admission Attending Care    Care    Encounter 

Source



        Date/Time Date/Time Type    Type    Clinicians Facility Department ID   

   

 

        2022 Outpatient R       STEPHAN  [facility]    [id number]

[id number] Univers



        16:30:00 16:55:17                 [facility]

 

        2022 Laboratory         Only, Arnulfo Pob2 Test [facility]    1.2

.[id number] 

Univers



        16:30:00 16:45:00 Only            James Hess 350.1.13

.10         ity [facility] [id number]         [place]

[place]



                                                PROFESSIO [phone]         Me

dical



                                                NAL     225             [facility]                 

 

        2022 Imm/Inj         Nurse, Arnulfo Pob Immunization [facility]  

  [id number] 

[id number]                                Univers



        15:40:00 15:40:00 Visit           James Hess 350.1.13

.10         ity [facility] [id number]         [place]

[place]



                                                PROFESSIO [phone]         Me

dical



                                                NAL     421             [facility]                 

 

        2022 Outpatient R       STEPHAN  [facility]    [id number]

115 Univers



        15:40:00 15:31:14                 JAMES                          [facility] [place] [facility]

 

        2021 Emergency         Duglas [facility]    [id number]

[id number] Univers



        15:58:00 18:12:00                 Azra Preciado 350.1.13.10        

 [facility] [id number]         [facility]  [phone]         Medi

[place]



                                                        084             Branch

 

        2021 Emergency X       DUGLAS [facility]    ERT     [id number]

[id number] Univers



        15:58:00 18:12:00                 AZRA boateng [facility]







Results







           Test       Test       Test       Results    Result     Source



           Description Time       Comments              Comments   

 

           XR HIPS 2 VW 2021-              Impression: No evidence           

 University [place] [address]                    of acute fracture or            [facility]



                      23:33:55              dislocation. RL: [id number]            [facility]



                                            Electronically signed by            



                                            Jeremiah Doan MD at            



                                            3/3/2021 5:33 PMClinical            



                                            indication: Left hip            



                                            pain. Ordering            



                                            Physician: AZRA GALVEZ Technique: The            



                                            frontal view the pelvis            



                                            and 2 additional views            



                                            of the lefthip were            



                                            obtained. ?There are no            



                                            prior pelvic or left hip            



                                            imaging studiesavailable            



                                            for comparison.            



                                            Findings: There is no            



                                            evidence of an acute            



                                            fracture or            



                                            dislocationinvolving the            



                                            bones of the pelvis with            



                                            particular attention to            



                                            the lefthip. ?There is            



                                            no radiopaque soft            



                                            tissue foreign body.            



                                            [facility], Radiant Results            



                                            Inft User - 2021            



                                            5:34 PM CSTClinical            



                                            indication: Left hip            



                                            pain.Ordering Physician:            



                                            AZRA FLOWERLETechnique: The            



                                            frontal view the pelvis            



                                            and 2 additional views            



                                            of the lefthip were            



                                            obtained. There are no            



                                            prior pelvic or left hip            



                                            imaging studiesavailable            



                                            for comparison.Findings:            



                                            There is no evidence of            



                                            an acute fracture or            



                                            dislocationinvolving the            



                                            bones of the pelvis with            



                                            particular attention to            



                                            the lefthip. There is no            



                                            radiopaque soft tissue            



                                            foreign               



                                            body.IMPRESSIONImpressio            



                                            n:No evidence of acute            



                                            fracture or            



                                            dislocation.RL:            



                                            4507Electronically            



                                            signed by Jeremiah Doan MD at 3/3/2021 5:33 PM            









                    URINALYSIS          2021 22:52:00 









                      Test Item  Value      Reference Range Interpretation Comme

nts









             APPEARANCE (test code = Clear        Clear                     



             2659285466)                                         

 

             COLOR (test code = 4591180918) Yellow       Yellow                 

   

 

             PH (test code = 2553147783)              4.8-8.0                   

 

             SP GRAVITY (test code =              1.003-1.030  H            



             7226283963)                                         

 

             GLU U QUAL (test code = Normal       Normal                    



             9288368328)                                         

 

             BLOOD (test code = 7810244206) Negative     Negative               

   

 

             KETONES (test code = 7222402484) Negative     Negative             

     

 

             PROTEIN (test code = 2887-8) 30 mg/dL     Negative     A           

 

 

             UROBILIN (test code = 4.0 mg/dL    Normal       A            



             0920336632)                                         

 

             BILIRUBIN (test code = Negative     Negative                  



             1370197205)                                         

 

             NITRITE (test code = 5684392253) Negative     Negative             

     

 

             LEUK ESTEFANÍA (test code = Negative     Negative                  



             0446618813)                                         

 

             RBC/HPF (test code = 7406311141)              See_Comment  H       

      [Automated message] The



                                                                 system which ge

nerated this



                                                                 result transmit

shanelle reference



                                                                 range: 0 - 3 HP

F. The



                                                                 reference range

 was not used



                                                                 to interpret th

is result as



                                                                 normal/abnormal

.

 

             WBC/HPF (test code = 3932068097)              See_Comment          

      [Automated message] The



                                                                 system which ge

nerated this



                                                                 result transmit

shanelle reference



                                                                 range: 0 - 5 HP

F. The



                                                                 reference range

 was not used



                                                                 to interpret th

is result as



                                                                 normal/abnormal

.

 

             BACTERIA (test code = Negative     Negative                  



             2211032714)                                         

 

             MUCOUS (test code = 8836980995) Moderate     Negative LPF A        

    

 

             AMORPHOUS (test code = Rare         Rare HPF                  



             6668859393)                                         

 

             SQ EPITH (test code =              HPF                       



             7857524004)                                         

 

             Lab Interpretation (test code = Abnormal                           

    



             97548-3)                                            



[facility]

## 2022-12-02 NOTE — EDPHYS
Physician Documentation                                                                           

 [facility]                                                                 

Name: Chiquis Newberry                                                                              

Age: 47 yrs                                                                                       

Sex: Female                                                                                       

: 1975                                                                                   

MRN: [id number]                                                                                   

Arrival Date: 2022                                                                          

Time: 13:39                                                                                       

Account#: [id number]                                                                            

Bed DIS3                                                                                          

Private MD:                                                                                       

ED Physician Julien Mackenzie                                                                         

HPI:                                                                                              

                                                                                             

14:12 This 47 yrs old Black Female presents to ER via Ambulatory with complaints of Suture    cp  

      Removal.                                                                                    

14:12 The patient has sutures on the dorsal side metacarpal head left index finger. Previous  cp  

      treatment: The patient was initially treated 10 day(s) ago, the care was rendered at        

      [facility], Treatment type: The patient's original treatment         

      included sutures.                                                                           

                                                                                                  

OB/GYN:                                                                                           

14:10 LMP N/A - Hysterectomy                                                                  kb3 

                                                                                                  

Historical:                                                                                       

- Allergies:                                                                                      

14:10 No Known Allergies;                                                                     kb3 

- Home Meds:                                                                                      

14:10 meloxicam 15 mg Oral tab 1 tab once daily [Active]; tizanidine 2 mg Oral cap 1 cap      kb3 

      every 8 hours [Active];                                                                     

- PMHx:                                                                                           

14:10 chronic back pain;                                                                      kb3 

- PSHx:                                                                                           

14:10 None;                                                                                   kb3 

                                                                                                  

- Immunization history:: Adult Immunizations up to date, Client reports receiving the             

  2nd dose of the Covid vaccine, Last tetanus immunization: up to date.                           

- Social history:: Smoking status: Patient denies any tobacco usage or history of.                

                                                                                                  

                                                                                                  

ROS:                                                                                              

14:13 All other systems are negative.                                                         cp  

                                                                                                  

Exam:                                                                                             

14:13 Skin: Wound recheck: Suture laceration closure: the wound is healing well, no drainage, cp  

      no erythema, no swelling, mild dehiscence, 4 sutures in place dorsal side metacarpal        

      head left index finger.                                                                     

                                                                                                  

Vital Signs:                                                                                      

14:08  / 95; Pulse 91; Resp 20; Temp 98; Pulse Ox 96% ; Pain 0/10;                      kb3 

                                                                                                  

MDM:                                                                                              

14:11 Patient medically screened.                                                             cp  

14:14 Data reviewed: vital signs, nurses notes, and as a result, I will discharge patient.    cp  

                                                                                                  

                                                                                             

14:12 Order name: Splint - Finger; Complete Time: 14:35                                       cp  

                                                                                                  

Administered Medications:                                                                         

No medications were administered                                                                  

                                                                                                  

                                                                                                  

Disposition:                                                                                      

18:53 Co-signature as Attending Physician, Julien Mackenzie MD.                                    rn  

                                                                                                  

Disposition Summary:                                                                              

22 14:17                                                                                    

Discharge Ordered                                                                                 

      Location: Home                                                                          cp  

      Problem: new                                                                            cp  

      Symptoms: have improved                                                                 cp  

      Condition: Stable                                                                       cp  

      Diagnosis                                                                                   

        - Encounter for attention to dressings, sutures and drains                            cp  

      Followup:                                                                               cp  

        - With: Emergency Department                                                               

        - When: 4-5 days                                                                           

        - Reason: Staple/Suture removal                                                            

      Discharge Instructions:                                                                     

        - Discharge Summary Sheet                                                             cp  

        - Sutured Wound Care                                                                  cp  

      Forms:                                                                                      

        - Medication Reconciliation Form                                                      cp  

        - Thank You Letter                                                                    cp  

        - Antibiotic Education                                                                cp  

        - Prescription Opioid Use                                                             cp  

Signatures:                                                                                       

Julien Mackenzie MD MD   rn                                                   

Victorino Charles PA PA cp Bradberry, Kelly RN                    RN   kb3                                                  

                                                                                                  

**************************************************************************************************

## 2022-12-02 NOTE — ER
Nurse's Notes                                                                                     

 [facility]                                                                 

Name: Chiquis Newberry                                                                              

Age: 47 yrs                                                                                       

Sex: Female                                                                                       

: 1975                                                                                   

MRN: [id number]                                                                                   

Arrival Date: 2022                                                                          

Time: 13:39                                                                                       

Account#: [id number]                                                                            

Bed DIS3                                                                                          

Private MD:                                                                                       

Diagnosis: Encounter for attention to dressings, sutures and drains                               

                                                                                                  

Presentation:                                                                                     

                                                                                             

14:08 Chief complaint: Patient states: pt presents for suture removal from right knuckle.     kb3 

      Coronavirus screen: Vaccine status: Patient reports receiving the 2nd dose of the covid     

      vaccine. Client denies travel out of the [place] in the last 14 days. Ebola Screen:            

      Patient negative for fever greater than or equal to 101.5 degrees Fahrenheit, and           

      additional compatible Ebola Virus Disease symptoms Patient denies exposure to               

      infectious person. Patient denies travel to an Ebola-affected area in the 21 days           

      before illness onset. Initial Sepsis Screen: Does the patient meet any 2 criteria? No.      

      Patient's initial sepsis screen is negative. Does the patient have a suspected source       

      of infection? No. Patient's initial sepsis screen is negative. Risk Assessment: Do you      

      want to hurt yourself or someone else? Patient reports no desire to harm self or            

      others. Onset of symptoms was 2022.                                            

14:08 Method Of Arrival: Ambulatory                                                           kb3 

14:08 Acuity: OMKAR 5                                                                           kb3 

                                                                                                  

Triage Assessment:                                                                                

14:10 General: Appears in no apparent distress. Behavior is calm, cooperative. Pain: Denies   kb3 

      pain.                                                                                       

                                                                                                  

OB/GYN:                                                                                           

14:10 LMP N/A - Hysterectomy                                                                  kb3 

                                                                                                  

Historical:                                                                                       

- Allergies:                                                                                      

14:10 No Known Allergies;                                                                     kb3 

- Home Meds:                                                                                      

14:10 meloxicam 15 mg Oral tab 1 tab once daily [Active]; tizanidine 2 mg Oral cap 1 cap      kb3 

      every 8 hours [Active];                                                                     

- PMHx:                                                                                           

14:10 chronic back pain;                                                                      kb3 

- PSHx:                                                                                           

14:10 None;                                                                                   kb3 

                                                                                                  

- Immunization history:: Adult Immunizations up to date, Client reports receiving the             

  2nd dose of the Covid vaccine, Last tetanus immunization: up to date.                           

- Social history:: Smoking status: Patient denies any tobacco usage or history of.                

                                                                                                  

                                                                                                  

Screenin:47 Abuse screen: Denies threats or abuse. Denies injuries from another. Nutritional        ss  

      screening: No deficits noted. Tuberculosis screening: Never had TB. Fall Risk None          

      identified.                                                                                 

                                                                                                  

Assessment:                                                                                       

14:48 General: Appears in no apparent distress. comfortable, Behavior is calm, cooperative.   ss  

      Neuro: Level of Consciousness is awake, alert, obeys commands. Respiratory: Airway is       

      patent Respiratory effort is even, unlabored, Respiratory pattern is regular,               

      symmetrical. Derm: Skin is intact, is healthy with good turgor, Skin is pink, warm \T\      

      dry. normal. Musculoskeletal: Circulation, motion, and sensation intact. Range of           

      motion: Swelling absent.                                                                    

                                                                                                  

Vital Signs:                                                                                      

14:08  / 95; Pulse 91; Resp 20; Temp 98; Pulse Ox 96% ; Pain 0/10;                      kb3 

                                                                                                  

ED Course:                                                                                        

13:39 Patient arrived in ED.                                                                  mr  

13:42 Victorino Charles PA is PHCP.                                                                cp  

13:42 Julien Mackenzie MD is Attending Physician.                                                cp  

14:10 Triage completed.                                                                       kb3 

14:10 Arm band placed on right wrist.                                                         kb3 

14:48 Patient has correct armband on for positive identification.                             ss  

14:48 No provider procedures requiring assistance completed. Patient did not have IV access   ss  

      during this emergency room visit. finger splint placed to affected area.                    

                                                                                                  

Administered Medications:                                                                         

No medications were administered                                                                  

                                                                                                  

                                                                                                  

Medication:                                                                                       

14:48 VIS not applicable for this client.                                                     ss  

                                                                                                  

Outcome:                                                                                          

14:17 Discharge ordered by MD.                                                                cp  

14:49 Discharged to home ambulatory.                                                          ss  

14:49 Condition: good                                                                             

14:49 Discharge instructions given to patient, Instructed on discharge instructions, follow       

      up and referral plans. Demonstrated understanding of instructions, follow-up care,          

      splint care.                                                                                

14:49 Patient left the ED.                                                                    ss  

                                                                                                  

Signatures:                                                                                       

Sarah Casillas                                 mr                                                   

Kiley Hoang, RN                      RN   ss                                                   

Victorino Charles PA PA cp Bradberry, Kelly, RN                    RN   kb3                                                  

                                                                                                  

**************************************************************************************************

## 2022-12-07 ENCOUNTER — HOSPITAL ENCOUNTER (EMERGENCY)
Dept: HOSPITAL 97 - ER | Age: 47
Discharge: HOME | End: 2022-12-07
Payer: COMMERCIAL

## 2022-12-07 DIAGNOSIS — Z48.02: Primary | ICD-10-CM

## 2022-12-07 NOTE — XMS REPORT
Continuity of Care Document

                           Created on:2022



Patient:TORO SMITH

Sex:Female

:1975

External Reference #:[id number]





Demographics







                          Address                   [address]



                                                    [place] [address]

 

                          Home Phone                [phone]

 

                          Work Phone                [phone]

 

                          Mobile Phone              [phone]

 

                          Email Address             [email or web address]

 

                          Preferred Language        English

 

                          Marital Status            Unknown

 

                          Alevism Affiliation     Unknown

 

                          Race                      Unknown

 

                          Additional Race(s)        Unavailable

 

                          Ethnic Group              Unknown









Author







                          Organization              [facility]

t

 

                          Address                   [address]



                                                    [place] [address]

 

                          Phone                     [phone]









Support







                Name            Relationship    Address         Phone

 

                EMPERATRIZ LEBLANC               [address] [phone]

[phone]



                                                [address]        



                                                [place] [address] 

 

                ASIF URIAS    [address]            [address]         [phone]



                                                [place], [place] 









Care Team Providers







                    Name                Role                Phone

 

                    ADAN BISWAS Primary Care Physician Unavailable

 

                    JAMES HESS Attending Clinician Unavailable

 

                    Only, Arnulfo Pob2 Test Attending Clinician Unavailable

 

                    James Hess DO Attending Clinician [phone]

 

                    Nurse, Arnulfo Pob Immunization Attending Clinician Unavailable

 

                    Azra Kc Attending Clinician [phone]

 

                    AZRA GALVEZ Attending Clinician Unavailable

 

                    AZRA GALVEZ Admitting Clinician Unavailable









Payers







           Payer Name Policy Type Policy Number Effective Date Expiration Date S

ource

 

           [facility]            [id number] 2020            



                                            00:00:00              







Problems







       Condition Condition Condition Status Onset  Resolution Last   Treating Co

mments 

Source



       Name   Details Category        Date   Date   Treatment Clinician        



                                                 Date                 

 

       Other  Other  Disease Active 2016                             [facility]



       general general               2-06                               ity of



       counseling counseling               00:00:                             [place]

[place]



       and advice and advice               00                                 Me

dical



       for    for                                                     Branch



       contracept contracept                                                  



       santi    santi                                                     



       management management                                                  

 

       Elevated Elevated Disease Active 2016                             Unive

rs



       blood  blood                2-06                               ity of



       pressure pressure               00:00:                             [place]



       reading reading               00                                 Medical



       without without                                                  Branch



       diagnosis diagnosis                                                  



       of     of                                                      



       hypertensi hypertensi                                                  



       on     on                                                      

 

       Fibroids Fibroids Disease Active 2016                      Overview: Un

rox



                                   2-06                        Formattin ity of



                                   00:00:                      g of this [place]



                                   00                          note   Medical



                                                               might be Branch



                                                               different 



                                                               from the 



                                                               original. 



                                                               Reports 



                                                               diagnosed 



                                                               in , 



                                                               records 



                                                               requested 

 

       Anemia Anemia Disease Active                       Overview: Univer

s



                                                           Formattin ity of



                                   00:00:                      g of this [place]



                                   00                          note   Medical



                                                               might be Branch



                                                               different 



                                                               from the 



                                                               original. 



                                                               ICD10  



                                                               Diagnosis 



                                                               Term   



                                                               Replacer 



                                                               Utility 

 

       Well woman Well woman Disease Active                       Overview

: Univers



       exam   exam                                         Formattin ity of



                                   00:00:                      g of this [place]



                                   00                          note   Medical



                                                               might be Branch



                                                               different 



                                                               from the 



                                                               original. 



                                                               Pap    



                                                               smear- 



                                                               NIL with 



                                                               absent 



                                                               ECC.   



                                                               Repeat in 



                                                               1      



                                                               [id number] 



                                                               Diagnosis 



                                                               Term   



                                                               Replacer 



                                                               Utility 

 

       Morbid Morbid Disease Active                              [facility]



       obesity obesity                                              ity of



                                   00:00:                             [place]



                                   00                                 Medical



                                                                      Branch







Allergies, Adverse Reactions, Alerts







       Allergy Allergy Status Severity Reaction(s) Onset  Inactive Treating Comm

ents 

Source



       Name   Type                        Date   Date   Clinician        

 

       Codeine Propensi Active        Hives                        Univers



              ty to                                               ity of



              adverse                      00:00:                      [place]



              reaction                      00                          Medical



              s                                                       Branch

 

       CODEINE DRUG   Active        Hives                        Univers



              INGREDI                                              ity of



                                          00:00:                      [place]



                                          00                          Medical



                                                                      Branch







Social History







           Social Habit Start Date Stop Date  Quantity   Comments   Source

 

           Exposure to                       Not sure              [facility]



           SARS-CoV-2                                             [place] [facility]



           (event)                                                Branch

 

           History SDOH                                             University o

f



           Alcohol Frequency                                             [facility]

edical



                                                                  Branch

 

           History SDOH                                             University o

f



           Alcohol Std                                             [place] [facility]



           Drinks                                                 Branch

 

           History [facility]                                             University o

f



           Alcohol Binge                                             [place] [facility]

al



                                                                  Branch

 

           Alcohol intake 2021 Current drinker of            Un

iversity of



                      00:00:00   00:00:00   alcohol (finding)            [facility]

edical



                                                                  Branch

 

           Tobacco use and 2014 Never used            Universit

y of



           exposure   00:00:00   00:00:00                         [facility]



                                                                  Branch

 

           Alcohol Comment 2014 occasionally            Univers

ity of



                      00:00:00   00:00:00                         [facility]



                                                                  Branch

 

           Sex Assigned At [birth year]                       Universit

y of



           Birth      00:00:00   00:00:00                         [facility]



                                                                  Branch









                Smoking Status  Start Date      Stop Date       Source

 

                Never smoker                                    [facility]







Medications







       Ordered Filled Start  Stop   Current Ordering Indication Dosage Frequency

 Signature

                    Comments            Components          Source



     Medication Medication Date Date Medication? Clinician                (SIG) 

          



     Name Name                                                   

 

     acetaminoph      2021- No             1000mg      1,000 mg,         

  Univers



     en        3-04 03-03                          Oral,           ity of



     (TYLENOL)      00:45: 23:39                          ONCE, 1           [place]

s



     tablet      00   :00                           dose, Wed           Medical



     1,000 mg                                         3/3/21 at           Branch



                                                  1845,           



                                                  Routine           

 

     dexamethaso      2021- No             10mg      10 mg,           Uni

vers



     ne        3-04 03-03                          Intramuscu           ity of



     (DECADRON      00:30: 23:37                          lar, ONCE,           T

exas



     PHOSPHATE)      00   :00                           1 dose,           Medica

l



     injection                                         Wed 3/3/21           Bran

ch



     10 mg                                         at 1830,           



                                                  STAT           

 

     ketorolac      2021- No             30mg      30 mg,           Unive

rs



     (TORADOL)      3-03 03-03                          Intramuscu           ity

 of



     injection      23:30: 22:32                          lar, ONCE,           T

exas



     30 mg      00   :00                           1 dose,           Medical



                                                  Wed 3/3/21           Branch



                                                  at 1730,           



                                                  ASAP<br>Fa           



                                                  culty           



                                                  member           



                                                  approving           



                                                  Restricted           



                                                  medication           



                                                  :              



                                                  AZRA GALVEZ           

 

     traMADoL 50            Yes       4647 50mg      Take 1           Univ

ers



     mg tablet      3-03                               tablet by           ity o

f



               00:00:                               mouth           [place]



               00                                 every 6           Medical



                                                  (six)           Branch



                                                  hours as           



                                                  needed for           



                                                  Pain           



                                                  (scale           



                                                  7-10).           



                                                  Indication           



                                                  s: acute           



                                                  pain           

 

     ibuprofen            Yes       [id number] 800mg      Take 1           U

nivers



     800 mg      3-03                               tablet by           ity of



     tablet      00:00:                               mouth           [place]



               00                                 every 6           Medical



                                                  (six)           Branch



                                                  hours as           



                                                  needed for           



                                                  Pain           



                                                  (scale           



                                                  4-6).           

 

     traMADoL 50            Yes       4647 50mg      Take 1           Univ

ers



     mg tablet      3-03                               tablet by           ity o

f



               00:00:                               mouth           [place]



               00                                 every 6           Medical



                                                  (six)           Branch



                                                  hours as           



                                                  needed for           



                                                  Pain           



                                                  (scale           



                                                  7-10).           



                                                  Indication           



                                                  s: acute           



                                                  pain           

 

     ibuprofen            Yes       [id number] 800mg      Take 1           U

nivers



     800 mg      3-03                               tablet by           ity of



     tablet      00:00:                               mouth           [place]



               00                                 every 6           Medical



                                                  (six)           Branch



                                                  hours as           



                                                  needed for           



                                                  Pain           



                                                  (scale           



                                                  4-6).           

 

     traMADoL 50            Yes       4647 50mg      Take 1           Univ

ers



     mg tablet      3-03                               tablet by           ity o

f



               00:00:                               mouth           [place]



               00                                 every 6           Medical



                                                  (six)           Branch



                                                  hours as           



                                                  needed for           



                                                  Pain           



                                                  (scale           



                                                  7-10).           



                                                  Indication           



                                                  s: acute           



                                                  pain           

 

     ibuprofen            Yes       [id number] 800mg      Take 1           U

nivers



     800 mg      3-03                               tablet by           ity of



     tablet      00:00:                               mouth           [place]



               00                                 every 6           Medical



                                                  (six)           Branch



                                                  hours as           



                                                  needed for           



                                                  Pain           



                                                  (scale           



                                                  4-6).           

 

     NUVARING      2016      Yes       [id number] 1{each}      Insert 1        

   Univers



     (NUVARING)      2-06                               Each into           ity 

of



     0.12-0.015      00:00:                               vagina           [place]



     mg/24 hr      00                                 once every           Medic

al



     vaginal                                         month.           Branch



     insert                                         Insert           



                                                  vaginally           



                                                  and leave           



                                                  in place           



                                                  for 3           



                                                  consecutiv           



                                                  e weeks,           



                                                  then           



                                                  remove for           



                                                  1 week.           

 

     NUVARING      2016      Yes       [id number] 1{each}      Insert 1        

   [facility]



     (NUVARING)      2-06                               Each into           ity 

of



     0.12-0.015      00:00:                               vagina           [place]



     mg/24 hr      00                                 once every           Medic

al



     vaginal                                         month.           Branch



     insert                                         Insert           



                                                  vaginally           



                                                  and leave           



                                                  in place           



                                                  for 3           



                                                  consecutiv           



                                                  e weeks,           



                                                  then           



                                                  remove for           



                                                  1 week.           

 

     NUVARING      2016      Yes       [id number] 1{each}      Insert 1        

   Univers



     (NUVARING)      2-06                               Each into           ity 

of



     0.12-0.015      00:00:                               vagina           [place]



     mg/24 hr      00                                 once every           Medic

al



     vaginal                                         month.           Branch



     insert                                         Insert           



                                                  vaginally           



                                                  and leave           



                                                  in place           



                                                  for 3           



                                                  consecutiv           



                                                  e weeks,           



                                                  then           



                                                  remove for           



                                                  1 week.           

 

     MULTIVITS,C            Yes            1{tbl}      Take 1 Tab         

  Univers



     A,MINERALS/      1-30                               by mouth           ity 

of



     IRON/FA      16:45:                               daily.           [place]



     (ONE-A-DAY      12                                                Medical



     WOMENS                                                        Branch



     FORMULA                                                        



     ORAL)                                                        

 

     omega-3            Yes            1g        Take 1 g           Univer

s



     fatty      1-30                               by mouth           ity of



     acids-vitam      16:45:                               daily.           [place]

s



     in E (FISH      12                                                Medical



     OIL) 1,000                                                        Branch



     mg capsule                                                        

 

     MULTIVITS,C            Yes            1{tbl}      Take 1 Tab         

  Univers



     A,MINERALS/      1-30                               by mouth           ity 

of



     IRON/FA      10:45:                               daily.           [place]



     (ONE-A-DAY      12                                                Medical



     WOMENS                                                        Branch



     FORMULA                                                        



     ORAL)                                                        

 

     omega-3            Yes            1g        Take 1 g           Univer

s



     fatty      1-30                               by mouth           ity of



     acids-vitam      10:45:                               daily.           [place]

s



     in E (FISH      12                                                Medical



     OIL) 1,000                                                        [facility]



     mg capsule                                                        

 

     MULTIVITS,C      2014-0      Yes            1{tbl}      Take 1 Tab         

  [facility]



     A,MINERALS/      1-30                               by mouth           ity 

of



     IRON/FA      10:45:                               daily.           [place]



     (ONE-A-DAY      12                                                Medical



     WOMENS                                                        [place]



     FORMULA                                                        



     ORAL)                                                        

 

     omega-3            Yes            1g        Take 1 g           Univer

s



     fatty      30                               by mouth           ity of



     acids-vitam      10:45:                               daily.           [place]

[place]



     in E (FISH      12                                                Medical



     OIL) 1,000                                                        [facility]



     mg capsule                                                        







Immunizations







           Ordered    Filled Immunization Date       Status     Comments   [facility]

e



           Immunization Name Name                                        

 

           SARS-COV-2 COVID-19            2022 Completed             Unive

rsity of



           BRANDON/J&J VACCINE            00:00:00                         [facility]

 

           SARS-COV-2 COVID-19            2022 Completed             Unive

rsity of



           BRANDON/J&J VACCINE            00:00:00                         [facility]

 

           SARS-COV-2 COVID-19            2021 Completed             Unive

rsity of



           BRANDON/J&J VACCINE            00:00:00                         [facility]                  2014 Completed             [facility]



                                 00:00:00                         [facility] [place] [facility]                  2014 Completed             [facility]



                                 00:00:00                         [facility] [place] [facility]                  2014 Completed             [facility]



                                 00:00:00                         [facility]

 

           Td                    2003 Completed             [facility]



                                 00:00:00                         [facility]

 

           Td                    2003 Completed             [facility]



                                 00:00:00                         [facility]

 

           Td                    2003 Completed             [facility]



                                 00:00:00                         [facility]







Vital Signs







             Vital Name   Observation Time Observation Value Comments     Source

 

             Systolic blood 2021 00:06:46 142 mm[Hg]                Univer

sity of



             pressure                                            [facility]

 

             Diastolic blood 2021 00:06:46 94 mm[Hg]                 Unive

rsity of



             pressure                                            [facility]

 

             Heart rate   2021 00:05:40 80 /min                   [facility]

 

             Respiratory rate 2021 00:05:40 16 /min                   [facility]

 

             Oxygen saturation in 2021 00:05:40 99 /min                   

[facility]



             Arterial blood by                                        [place] [facility]

[place]



             Pulse oximetry                                        [place]

 

             Body temperature 2021 21:56:00 37 Azra                    [facility]

 

             Body weight  2021 21:56:00 99.791 kg                 [facility]

 

             BMI          2021 21:56:00 36.61 kg/m2               [facility]







Procedures







                Procedure       Date / Time Performed Performing Clinician Jason portillo

 

                SARS-COV-2 COVID-19 2022 21:34:19 Doctor Unassigned, No Un

iversity of 

[place]



                VACCINE,0.5ML,IM                 Name            [facility]



                (BRANDON/J&J)                                   

 

                XR HIPS 2 VW LEFT 2021 23:00:50 Azra Galvez [facility]

 

                URINALYSIS      2021 22:28:00 Azra Galvez [facility]







Encounters







        Start   End     Encounter Admission Attending Care    Care    Encounter 

Source



        Date/Time Date/Time Type    Type    Clinicians Facility Department ID   

   

 

        2022 Outpatient R       STEPHAN  [facility]    [id number]

[id number] Univers



        16:30:00 16:55:17                 [facility]

 

        2022 Laboratory         Only, Arnulfo Pob2 Test [facility]    1.2

[id number] 

Univers



        16:30:00 16:45:00 Only            James Hess 350.1.13

.10         ity [facility] [id number]         [place]

s



                                                PROFESSIO [phone]         Me

dical



                                                NAL     225             [facility]                 

 

        2022 Imm/Inj         Nurse, Arnulfo Pob Immunization [facility]  

  [id number] 

[id number]                                Univers



        15:40:00 15:40:00 Visit           James Hess 350.1.13

.10         ity [facility] [id number]         [place]

[place]



                                                PROFESSIO [phone]         Me

dical



                                                NAL     421             [facility]                 

 

        2022 Outpatient R       STEPHAN  [facility]    [id number]

115 Univers



        15:40:00 15:31:14                 JAMES                          [facility]

 

        2021 Emergency         Duglas [facility]    [id number]

[id number] [facility]



        15:58:00 18:12:00                 Azra Preciado 350.1.13.10        

 [facility] [id number]         [facility]  [phone]         Medi

warren



                                                        084             [facility]anch

 

        2021 Emergency X       DUGLAS [facility]    ERT     [id number]

[id number] Univers



        15:58:00 18:12:00                 AZRA boateng [facility]







Results







           Test       Test       Test       Results    Result     Source



           Description Time       Comments              Comments   

 

           XR HIPS 2 VW 2021-              Impression: No evidence           

 University [place] [address]                    of acute fracture or            [facility]



                      23:33:55              dislocation. RL: [id number]            [facility]



                                            Electronically signed by            



                                            Jeremiah Doan MD at            



                                            3/3/2021 5:33 PMClinical            



                                            indication: Left hip            



                                            pain. Ordering            



                                            Physician: AZRA GALVEZ Technique: The            



                                            frontal view the pelvis            



                                            and 2 additional views            



                                            of the lefthip were            



                                            obtained. ?There are no            



                                            prior pelvic or left hip            



                                            imaging studiesavailable            



                                            for comparison.            



                                            Findings: There is no            



                                            evidence of an acute            



                                            fracture or            



                                            dislocationinvolving the            



                                            bones of the pelvis with            



                                            particular attention to            



                                            the lefthip. ?There is            



                                            no radiopaque soft            



                                            tissue foreign body.            



                                            [facility], Radiant Results            



                                            Inft User - 2021            



                                            5:34 PM CSTClinical            



                                            indication: Left hip            



                                            pain.Ordering Physician:            



                                            AZRA FLOWERLETechnique: The            



                                            frontal view the pelvis            



                                            and 2 additional views            



                                            of the lefthip were            



                                            obtained. There are no            



                                            prior pelvic or left hip            



                                            imaging studiesavailable            



                                            for comparison.Findings:            



                                            There is no evidence of            



                                            an acute fracture or            



                                            dislocationinvolving the            



                                            bones of the pelvis with            



                                            particular attention to            



                                            the lefthip. There is no            



                                            radiopaque soft tissue            



                                            foreign               



                                            body.IMPRESSIONImpressio            



                                            n:No evidence of acute            



                                            fracture or            



                                            dislocation.RL:            



                                            4507Electronically            



                                            signed by Jeremiah Doan MD at 3/3/2021 5:33 PM            









                    URINALYSIS          2021 22:52:00 









                      Test Item  Value      Reference Range Interpretation Comme

nts









             APPEARANCE (test code = Clear        Clear                     



             7447911429)                                         

 

             COLOR (test code = 8895227706) Yellow       Yellow                 

   

 

             PH (test code = 5770257922)              4.8-8.0                   

 

             SP GRAVITY (test code =              1.003-1.030  H            



             0993184844)                                         

 

             GLU U QUAL (test code = Normal       Normal                    



             4899260534)                                         

 

             BLOOD (test code = 6864552450) Negative     Negative               

   

 

             KETONES (test code = 0220698847) Negative     Negative             

     

 

             PROTEIN (test code = 2887-8) 30 mg/dL     Negative     A           

 

 

             UROBILIN (test code = 4.0 mg/dL    Normal       A            



             9885788408)                                         

 

             BILIRUBIN (test code = Negative     Negative                  



             3355452700)                                         

 

             NITRITE (test code = 7780869023) Negative     Negative             

     

 

             LEUK ESTEFANÍA (test code = Negative     Negative                  



             5939910966)                                         

 

             RBC/HPF (test code = 6192075841)              See_Comment  H       

      [Automated message] The



                                                                 system which ge

nerated this



                                                                 result transmit

shanelle reference



                                                                 range: 0 - 3 HP

F. The



                                                                 reference range

 was not used



                                                                 to interpret th

is result as



                                                                 normal/abnormal

.

 

             WBC/HPF (test code = 8110778982)              See_Comment          

      [Automated message] The



                                                                 system which ge

nerated this



                                                                 result transmit

shanelle reference



                                                                 range: 0 - 5 HP

F. The



                                                                 reference range

 was not used



                                                                 to interpret th

is result as



                                                                 normal/abnormal

.

 

             BACTERIA (test code = Negative     Negative                  



             5371152859)                                         

 

             MUCOUS (test code = 4551956274) Moderate     Negative LPF A        

    

 

             AMORPHOUS (test code = Rare         Rare HPF                  



             8526762586)                                         

 

             SQ EPITH (test code =              HPF                       



             5272098476)                                         

 

             Lab Interpretation (test code = Abnormal                           

    



             43942-8)                                            



[facility]

## 2022-12-07 NOTE — EDPHYS
Physician Documentation                                                                           

 [facility]                                                                 

Name: Chiquis Newberry                                                                              

Age: 47 yrs                                                                                       

Sex: Female                                                                                       

: 1975                                                                                   

MRN: [id number]                                                                                   

Arrival Date: 2022                                                                          

Time: 18:07                                                                                       

Account#: [id number]                                                                            

Bed Waiting                                                                                       

Private MD:                                                                                       

ED Physician Julien Mackenzie                                                                         

HPI:                                                                                              

                                                                                             

18:50 This 47 yrs old Black Female presents to ER via Unassigned with complaints of Suture    jmm 

      Removal.                                                                                    

18:50 The patient has sutures on the right hand. Sutures/staples progress: The patient's      jmm 

      wound displays wound dehiscence. The patient has not experienced similar symptoms in        

      the past.                                                                                   

                                                                                                  

                                                                                                  

                                                                                                  

ROS:                                                                                              

18:50 Constitutional: Negative for fever, chills, and weight loss, Cardiovascular: Negative   jmm 

      for chest pain, palpitations, and edema, Respiratory: Negative for shortness of breath,     

      cough, wheezing, and pleuritic chest pain.                                                  

18:50 All other systems are negative.                                                             

                                                                                                  

Exam:                                                                                             

18:50 Constitutional:  This is a well developed, well nourished patient who is awake, alert,  jmm 

      and in no acute distress. Head/Face:  atraumatic. Eyes:  EOMI, no conjunctival erythema     

      appreciated ENT:  Moist Mucus Membranes Neck:  Trachea midline, Supple Chest/axilla:        

      Normal chest wall appearance and motion.   Cardiovascular:  Regular rate and rhythm.        

      No edema appreciated Respiratory:  Normal respirations, no respiratory distress             

      appreciated Abdomen/GI:  Non distended Back:  Normal ROM                                    

18:50 Skin: Wound dehiscence noted to the left hand laceration, no purulent drainage,             

      induration, or tenderness to palpation appreciated.                                         

18:50 Neuro: Orientation: is normal, Mentation: is normal, Memory: is normal.                     

18:50 Psych: Behavior/mood is pleasant, cooperative.                                              

                                                                                                  

MDM:                                                                                              

18:50 Patient medically screened.                                                             [facility] 

18:50 Data reviewed: vital signs, nurses notes. Counseling: I had a detailed discussion with  jmgeoff 

      the patient and/or guardian regarding: the historical points, exam findings, and any        

      diagnostic results supporting the discharge/admit diagnosis, the need for outpatient        

      follow up, to return to the emergency department if symptoms worsen or persist or if        

      there are any questions or concerns that arise at home.                                     

18:50 ED course: I did offer to remove sutures and apply a wet-to-dry dressing with follow-up jmm 

      to hand. Patient elected to keep the sutures. Will return for suture removal. Patient       

      is given follow-up with hand surgery for further evaluation..                               

                                                                                                  

Administered Medications:                                                                         

No medications were administered                                                                  

                                                                                                  

                                                                                                  

Disposition:                                                                                      

                                                                                             

07:04 Co-signature as Attending Physician, Julien Mackenzie MD.                                    rn  

                                                                                                  

Disposition Summary:                                                                              

22 18:51                                                                                    

Discharge Ordered                                                                                 

      Location: Home                                                                          [facility] 

      Condition: Stable                                                                       jmm 

      Diagnosis                                                                                   

        - Encounter for removal of sutures                                                    [facility] 

      Followup:                                                                               [facility] 

        - With: Paulie Cardoso MD                                                                

        - When: 2 - 3 days                                                                         

        - Reason: Recheck today's complaints, Continuance of care, Re-evaluation by your           

      physician                                                                                   

      Discharge Instructions:                                                                     

        - Discharge Summary Sheet                                                             [facility] 

        - Laceration Care, Adult                                                              jm 

      Forms:                                                                                      

        - Medication Reconciliation Form                                                      [facility] 

        - Thank You Letter                                                                    [facility] 

        - Antibiotic Education                                                                [facility] 

        - Prescription Opioid Use                                                             [facility] 

Signatures:                                                                                       

Julián Sweeney PA                       PA   Julien Ferrer MD MD   rn                                                   

                                                                                                  

Corrections: (The following items were deleted from the chart)                                    

                                                                                             

19:11 18:50 Skin: Wound dehiscence noted to the right hand laceration, no purulent drainage,  m 

      induration, or tenderness to palpation appreciated. [facility]                                     

                                                                                                  

**************************************************************************************************

## 2022-12-07 NOTE — ER
Nurse's Notes                                                                                     

 [facility]                                                                 

Name: Chiquis Newberry                                                                              

Age: 47 yrs                                                                                       

Sex: Female                                                                                       

: 1975                                                                                   

MRN: [id number]                                                                                   

Arrival Date: 2022                                                                          

Time: 18:07                                                                                       

Account#: [id number]                                                                            

Bed Waiting                                                                                       

Private MD:                                                                                       

Diagnosis: Encounter for removal of sutures                                                       

                                                                                                  

                                                                                                  

                                                                                                  

Assessment:                                                                                       

                                                                                             

18:38 Reassessment: Patel in triage educating pt on suture removal; stated suggests pt to   vg1 

      wait a couple more days, stated if removed now may dehisce.                                 

18:45 Reassessment: Pt has decided to leave sutures for a couple more days; provider.         [facility] 

                                                                                                  

ED Course:                                                                                        

18:07 Patient arrived in ED.                                                                  mr  

18:35 Julián Sweeney PA is PHCP.                                                              lloyd 

18:35 Julien Mackenzie MD is Attending Physician.                                                [facility] 

18:51 Paulie Cardoso MD is Referral Physician.                                              lloyd 

                                                                                                  

Administered Medications:                                                                         

No medications were administered                                                                  

                                                                                                  

                                                                                                  

Outcome:                                                                                          

18:51 Discharge ordered by MD.                                                                [facility] 

18:54 No charge visit due to Pt came in for suture removal and was advised by provider to     vg1 

      wait a couple of more days .                                                                

18:54 Patient left the ED.                                                                    1 

                                                                                                  

Signatures:                                                                                       

Julián Sweeney PA PA jmm Rivera, Mary mr Garcia, Victoria, RN                    RN   vg1                                                  

                                                                                                  

**************************************************************************************************

## 2022-12-10 ENCOUNTER — HOSPITAL ENCOUNTER (EMERGENCY)
Dept: HOSPITAL 97 - ER | Age: 47
Discharge: HOME | End: 2022-12-10
Payer: COMMERCIAL

## 2022-12-10 VITALS — SYSTOLIC BLOOD PRESSURE: 130 MMHG | OXYGEN SATURATION: 98 % | TEMPERATURE: 98.4 F | DIASTOLIC BLOOD PRESSURE: 90 MMHG

## 2022-12-10 DIAGNOSIS — Z48.02: Primary | ICD-10-CM

## 2022-12-10 PROCEDURE — 99281 EMR DPT VST MAYX REQ PHY/QHP: CPT

## 2022-12-10 NOTE — EDPHYS
Physician Documentation                                                                           

 [facility]                                                                 

Name: Chiquis Newberry                                                                              

Age: 47 yrs                                                                                       

Sex: Female                                                                                       

: 1975                                                                                   

MRN: [id number]                                                                                   

Arrival Date: 12/10/2022                                                                          

Time: 13:13                                                                                       

Account#: [id number]                                                                            

Bed DIS3                                                                                          

Private MD:                                                                                       

ED Physician Abraham Denise                                                                     

HPI:                                                                                              

12/10                                                                                             

14:11 This 47 yrs old Black Female presents to ER via Ambulatory with complaints of Suture    snw 

      Removal.                                                                                    

14:11 The patient has sutures on the . Previous treatment: The patient was initially treated  snw 

      the care was rendered at [facility], Treatment type: The             

      patient's original treatment included sutures. Sutures/staples progress: The patient        

      has no c/o's. The wound is well-healing with no redness, swelling, discharge, or            

      dehiscence reported. The patient has not experienced similar symptoms in the past. as       

      noted.                                                                                      

                                                                                                  

Historical:                                                                                       

- Allergies:                                                                                      

14:01 Codeine;                                                                                iw  

- PMHx:                                                                                           

14:01 chronic back pain;                                                                      iw  

- PSHx:                                                                                           

14:01 hysterectomy;                                                                           iw  

                                                                                                  

                                                                                                  

                                                                                                  

ROS:                                                                                              

14:10 Constitutional: Negative for fever, chills, and weight loss, Eyes: Negative for injury, snw 

      pain, redness, and discharge, ENT: Negative for injury, pain, and discharge, Neck:          

      Negative for injury, pain, and swelling, Cardiovascular: Negative for chest pain,           

      palpitations, and edema, Respiratory: Negative for shortness of breath, cough,              

      wheezing, and pleuritic chest pain, Abdomen/GI: Negative for abdominal pain, nausea,        

      vomiting, diarrhea, and constipation, Back: Negative for injury and pain, : Negative      

      for injury, bleeding, discharge, and swelling, MS/Extremity: Negative for injury and        

      deformity, Skin: Negative for injury, rash, and discoloration, Neuro: Negative for          

      headache, weakness, numbness, tingling, and seizure.                                        

                                                                                                  

Exam:                                                                                             

14:09 Constitutional:  This is a well developed, well nourished patient who is awake, alert,  snw 

      and in no acute distress. Head/Face:  Normocephalic, atraumatic. Eyes:  Pupils equal        

      round and reactive to light, extra-ocular motions intact.  Lids and lashes normal.          

      Conjunctiva and sclera are non-icteric and not injected.  Cornea within normal limits.      

      Periorbital areas with no swelling, redness, or edema. Chest/axilla:  Normal chest wall     

      appearance and motion.  Nontender with no deformity.  No lesions are appreciated.           

      Cardiovascular:  Regular rate and rhythm with a normal S1 and S2.  No gallops, murmurs,     

      or rubs.  Normal PMI, no JVD.  No pulse deficits. Respiratory:  Lungs have equal breath     

      sounds bilaterally, clear to auscultation and percussion.  No rales, rhonchi or wheezes     

      noted.  No increased work of breathing, no retractions or nasal flaring. Abdomen/GI:        

      Soft, non-tender, with normal bowel sounds.  No distension or tympany.  No guarding or      

      rebound.  No evidence of tenderness throughout. Back:  No spinal tenderness.  No            

      costovertebral tenderness.  Full range of motion. MS/ Extremity:  Pulses equal, no          

      cyanosis.  Neurovascular intact.  Full, normal range of motion. Neuro:  Awake and           

      alert, GCS 15, oriented to person, place, time, and situation.  Cranial nerves II-XII       

      grossly intact.  Motor strength 5/5 in all extremities.  Sensory grossly intact.            

      Cerebellar exam normal.  Normal gait.                                                       

14:09 Skin: Appearance: Color: normal in color, injury, laceration(s), the wound is               

      approximately  2.5 cm(s), of the dorsal aspect of proximal phalanx of left index finger     

      previously sutured, edges well approximated. No bleeding, redness, or discharge.            

                                                                                                  

Vital Signs:                                                                                      

13:58  / 90 RA (/reg); Pulse 89; Resp 16; Temp 98.4(O); Pulse Ox 98% on R/A; Weight     zm  

      77.11 kg; Height 5 ft. 6 in. (167.64 cm); Pain 0/10;                                        

13:58 Body Mass Index 27.44 (77.11 kg, 167.64 cm)                                             zm  

                                                                                                  

MDM:                                                                                              

13:15 Patient medically screened.                                                             snw 

20:58 Data reviewed: vital signs, nurses notes.                                               snw 

                                                                                                  

Administered Medications:                                                                         

No medications were administered                                                                  

                                                                                                  

                                                                                                  

Disposition:                                                                                      

17:30 Co-signature as Attending Physician, Abraahm Denise MD I agree with the assessment and rt  

      plan of care.                                                                               

                                                                                                  

Disposition Summary:                                                                              

12/10/22 14:12                                                                                    

Discharge Ordered                                                                                 

      Location: Home                                                                          snw 

      Condition: Stable                                                                       snw 

      Diagnosis                                                                                   

        - Encounter for removal of sutures                                                    snw 

      Followup:                                                                               snw 

        - With: Emergency Department                                                               

        - When: As needed                                                                          

        - Reason: Worsening of condition                                                           

      Followup:                                                                               snw 

        - With: Private Physician                                                                  

        - When: 2 - 3 days                                                                         

        - Reason: Recheck today's complaints, Continuance of care, Re-evaluation by your           

      physician                                                                                   

      Discharge Instructions:                                                                     

        - Discharge Summary Sheet                                                             snw 

        - Suture Removal, Care After                                                          snw 

        - Wound Care, Adult                                                                   snw 

      Forms:                                                                                      

        - Medication Reconciliation Form                                                      snw 

        - Thank You Letter                                                                    snw 

        - Antibiotic Education                                                                snw 

        - Prescription Opioid Use                                                             snw 

Signatures:                                                                                       

Abril Chandler FNP-C                   FNP-Csnw                                                  

Dania Berry, RN                     RN   iw                                                   

Abraham Denise MD MD   rt                                                   

                                                                                                  

**************************************************************************************************

## 2022-12-10 NOTE — XMS REPORT
Continuity of Care Document

                          Created on:December 10, 2022



Patient:TORO SMITH

Sex:Female

:1975

External Reference #:[id number]





Demographics







                          Address                   [address]



                                                    [place] [address]

 

                          Home Phone                [phone]

 

                          Work Phone                [phone]

 

                          Mobile Phone              [phone]

 

                          Email Address             [email or web address]

 

                          Preferred Language        English

 

                          Marital Status            Unknown

 

                          Buddhist Affiliation     Unknown

 

                          Race                      Unknown

 

                          Additional Race(s)        Unavailable

 

                          Ethnic Group              Unknown









Author







                          Organization              [facility]

t

 

                          Address                   [address]



                                                    [place] [address]

 

                          Phone                     [phone]









Support







                Name            Relationship    Address         Phone

 

                EMPERATRIZ LEBLANC               [address] [phone]

[phone]



                                                [address]        



                                                [place] [address] 

 

                ASIF URIAS    [address]            [address]         [phone]



                                                [place], [place] 









Care Team Providers







                    Name                Role                Phone

 

                    ADAN BISWAS Primary Care Physician Unavailable

 

                    JAMES HESS Attending Clinician Unavailable

 

                    Only, Arnulfo Pob2 Test Attending Clinician Unavailable

 

                    James Hess DO Attending Clinician [phone]

 

                    Nurse, Arnulfo Pob Immunization Attending Clinician Unavailable

 

                    Azra Kc Attending Clinician [phone]

 

                    AZRA GALVEZ Attending Clinician Unavailable

 

                    AZRA GALVEZ Admitting Clinician Unavailable









Payers







           Payer Name Policy Type Policy Number Effective Date Expiration Date S

ource

 

           [facility]            [id number] 2020            



                                            00:00:00              







Problems







       Condition Condition Condition Status Onset  Resolution Last   Treating Co

mments 

Source



       Name   Details Category        Date   Date   Treatment Clinician        



                                                 Date                 

 

       Other  Other  Disease Active 2016                             [facility]



       general general               2-06                               ity of



       counseling counseling               00:00:                             [place]

[place]



       and advice and advice               00                                 Me

dical



       for    for                                                     Branch



       contracept contracept                                                  



       santi    santi                                                     



       management management                                                  

 

       Elevated Elevated Disease Active 2016                             Unive

rs



       blood  blood                2-06                               ity of



       pressure pressure               00:00:                             [place]



       reading reading               00                                 Medical



       without without                                                  Branch



       diagnosis diagnosis                                                  



       of     of                                                      



       hypertensi hypertensi                                                  



       on     on                                                      

 

       Fibroids Fibroids Disease Active 2016                      Overview: Un

rox



                                   2-06                        Formattin ity of



                                   00:00:                      g of this [place]



                                   00                          note   Medical



                                                               might be Branch



                                                               different 



                                                               from the 



                                                               original. 



                                                               Reports 



                                                               diagnosed 



                                                               in , 



                                                               records 



                                                               requested 

 

       Anemia Anemia Disease Active                       Overview: Univer

s



                                                           Formattin ity of



                                   00:00:                      g of this [place]



                                   00                          note   Medical



                                                               might be Branch



                                                               different 



                                                               from the 



                                                               original. 



                                                               ICD10  



                                                               Diagnosis 



                                                               Term   



                                                               Replacer 



                                                               Utility 

 

       Well woman Well woman Disease Active                       Overview

: Univers



       exam   exam                                         Formattin ity of



                                   00:00:                      g of this [place]



                                   00                          note   Medical



                                                               might be Branch



                                                               different 



                                                               from the 



                                                               original. 



                                                               Pap    



                                                               smear- 



                                                               NIL with 



                                                               absent 



                                                               ECC.   



                                                               Repeat in 



                                                               1      



                                                               [id number] 



                                                               Diagnosis 



                                                               Term   



                                                               Replacer 



                                                               Utility 

 

       Morbid Morbid Disease Active                              Univers



       obesity obesity                                              ity of



                                   00:00:                             [place]



                                   00                                 Medical



                                                                      Branch







Allergies, Adverse Reactions, Alerts







       Allergy Allergy Status Severity Reaction(s) Onset  Inactive Treating Comm

ents 

Source



       Name   Type                        Date   Date   Clinician        

 

       Codeine Propensi Active        Hives                        Univers



              ty to                                               ity of



              adverse                      00:00:                      [place]



              reaction                      00                          Medical



              s                                                       Branch

 

       CODEINE DRUG   Active        Hives                        Univers



              INGREDI                                              ity of



                                          00:00:                      [place]



                                          00                          Medical



                                                                      Branch







Social History







           Social Habit Start Date Stop Date  Quantity   Comments   Source

 

           Exposure to                       Not sure              [facility]



           SARS-CoV-2                                             [place] [facility]



           (event)                                                Branch

 

           History SDOH                                             University o

f



           Alcohol Frequency                                             [facility]

edical



                                                                  Branch

 

           History SDOH                                             University o

f



           Alcohol Std                                             [place] [facility]



           Drinks                                                 Branch

 

           History [facility]                                             University o

f



           Alcohol Binge                                             [place] [facility]

al



                                                                  Branch

 

           Alcohol intake 2021 Current drinker of            Un

iversity of



                      00:00:00   00:00:00   alcohol (finding)            [facility]

edical



                                                                  Branch

 

           Tobacco use and 2014 Never used            Universit

y of



           exposure   00:00:00   00:00:00                         [facility]



                                                                  Branch

 

           Alcohol Comment 2014 occasionally            Univers

ity of



                      00:00:00   00:00:00                         [facility]



                                                                  Branch

 

           Sex Assigned At [birth year]                       Universit

y of



           Birth      00:00:00   00:00:00                         [facility]



                                                                  Branch









                Smoking Status  Start Date      Stop Date       Source

 

                Never smoker                                    [facility]







Medications







       Ordered Filled Start  Stop   Current Ordering Indication Dosage Frequency

 Signature

                    Comments            Components          Source



     Medication Medication Date Date Medication? Clinician                (SIG) 

          



     Name Name                                                   

 

     acetaminoph      2021- No             1000mg      1,000 mg,         

  Univers



     en        3-04 03-03                          Oral,           ity of



     (TYLENOL)      00:45: 23:39                          ONCE, 1           [place]

s



     tablet      00   :00                           dose, Wed           Medical



     1,000 mg                                         3/3/21 at           Branch



                                                  1845,           



                                                  Routine           

 

     dexamethaso      2021- No             10mg      10 mg,           Uni

vers



     ne        3-04 03-03                          Intramuscu           ity of



     (DECADRON      00:30: 23:37                          lar, ONCE,           T

exas



     PHOSPHATE)      00   :00                           1 dose,           Medica

l



     injection                                         Wed 3/3/21           Bran

ch



     10 mg                                         at 1830,           



                                                  STAT           

 

     ketorolac      2021- No             30mg      30 mg,           Unive

rs



     (TORADOL)      3-03 03-03                          Intramuscu           ity

 of



     injection      23:30: 22:32                          lar, ONCE,           T

exas



     30 mg      00   :00                           1 dose,           Medical



                                                  Wed 3/3/21           Branch



                                                  at 1730,           



                                                  ASAP<br>Fa           



                                                  culty           



                                                  member           



                                                  approving           



                                                  Restricted           



                                                  medication           



                                                  :              



                                                  AZRA GALVEZ           

 

     traMADoL 50            Yes       4647 50mg      Take 1           Univ

ers



     mg tablet      3-03                               tablet by           ity o

f



               00:00:                               mouth           [place]



               00                                 every 6           Medical



                                                  (six)           Branch



                                                  hours as           



                                                  needed for           



                                                  Pain           



                                                  (scale           



                                                  7-10).           



                                                  Indication           



                                                  s: acute           



                                                  pain           

 

     ibuprofen            Yes       [id number] 800mg      Take 1           U

nivers



     800 mg      3-03                               tablet by           ity of



     tablet      00:00:                               mouth           [place]



               00                                 every 6           Medical



                                                  (six)           Branch



                                                  hours as           



                                                  needed for           



                                                  Pain           



                                                  (scale           



                                                  4-6).           

 

     traMADoL 50            Yes       4647 50mg      Take 1           Univ

ers



     mg tablet      3-03                               tablet by           ity o

f



               00:00:                               mouth           [place]



               00                                 every 6           Medical



                                                  (six)           Branch



                                                  hours as           



                                                  needed for           



                                                  Pain           



                                                  (scale           



                                                  7-10).           



                                                  Indication           



                                                  s: acute           



                                                  pain           

 

     ibuprofen            Yes       [id number] 800mg      Take 1           U

nivers



     800 mg      3-03                               tablet by           ity of



     tablet      00:00:                               mouth           [place]



               00                                 every 6           Medical



                                                  (six)           Branch



                                                  hours as           



                                                  needed for           



                                                  Pain           



                                                  (scale           



                                                  4-6).           

 

     traMADoL 50            Yes       4647 50mg      Take 1           Univ

ers



     mg tablet      3-03                               tablet by           ity o

f



               00:00:                               mouth           [place]



               00                                 every 6           Medical



                                                  (six)           Branch



                                                  hours as           



                                                  needed for           



                                                  Pain           



                                                  (scale           



                                                  7-10).           



                                                  Indication           



                                                  s: acute           



                                                  pain           

 

     ibuprofen            Yes       [id number] 800mg      Take 1           U

nivers



     800 mg      3-03                               tablet by           ity of



     tablet      00:00:                               mouth           [place]



               00                                 every 6           Medical



                                                  (six)           Branch



                                                  hours as           



                                                  needed for           



                                                  Pain           



                                                  (scale           



                                                  4-6).           

 

     NUVARING      2016      Yes       [id number] 1{each}      Insert 1        

   Univers



     (NUVARING)      2-06                               Each into           ity 

of



     0.12-0.015      00:00:                               vagina           [place]



     mg/24 hr      00                                 once every           Medic

al



     vaginal                                         month.           Branch



     insert                                         Insert           



                                                  vaginally           



                                                  and leave           



                                                  in place           



                                                  for 3           



                                                  consecutiv           



                                                  e weeks,           



                                                  then           



                                                  remove for           



                                                  1 week.           

 

     NUVARING      2016      Yes       [id number] 1{each}      Insert 1        

   Univers



     (NUVARING)      2-06                               Each into           ity 

of



     0.12-0.015      00:00:                               vagina           [place]



     mg/24 hr      00                                 once every           Medic

al



     vaginal                                         month.           Branch



     insert                                         Insert           



                                                  vaginally           



                                                  and leave           



                                                  in place           



                                                  for 3           



                                                  consecutiv           



                                                  e weeks,           



                                                  then           



                                                  remove for           



                                                  1 week.           

 

     NUVARING      2016      Yes       [id number] 1{each}      Insert 1        

   Univers



     (NUVARING)      2-06                               Each into           ity 

of



     0.12-0.015      00:00:                               vagina           [place]



     mg/24 hr      00                                 once every           Medic

al



     vaginal                                         month.           Branch



     insert                                         Insert           



                                                  vaginally           



                                                  and leave           



                                                  in place           



                                                  for 3           



                                                  consecutiv           



                                                  e weeks,           



                                                  then           



                                                  remove for           



                                                  1 week.           

 

     MULTIVITS,C            Yes            1{tbl}      Take 1 Tab         

  Univers



     A,MINERALS/      1-30                               by mouth           ity 

of



     IRON/FA      16:45:                               daily.           [place]



     (ONE-A-DAY      12                                                Medical



     WOMENS                                                        Branch



     FORMULA                                                        



     ORAL)                                                        

 

     omega-3            Yes            1g        Take 1 g           Univer

s



     fatty      1-30                               by mouth           ity of



     acids-vitam      16:45:                               daily.           [place]

s



     in E (FISH      12                                                Medical



     OIL) 1,000                                                        Branch



     mg capsule                                                        

 

     MULTIVITS,C            Yes            1{tbl}      Take 1 Tab         

  Univers



     A,MINERALS/      1-30                               by mouth           ity 

of



     IRON/FA      10:45:                               daily.           [place]



     (ONE-A-DAY      12                                                Medical



     WOMENS                                                        Branch



     FORMULA                                                        



     ORAL)                                                        

 

     omega-3            Yes            1g        Take 1 g           Univer

s



     fatty      1-30                               by mouth           ity of



     acids-vitam      10:45:                               daily.           [place]

[place]



     in E (FISH      12                                                Medical



     OIL) 1,000                                                        Branch



     mg capsule                                                        

 

     MULTIVITS,C      2014-0      Yes            1{tbl}      Take 1 Tab         

  Univers



     A,MINERALS/      1-30                               by mouth           ity 

of



     IRON/FA      10:45:                               daily.           [place]



     (ONE-A-DAY      12                                                Medical



     WOMENS                                                        [employer]



     FORMULA                                                        



     ORAL)                                                        

 

     omega-3            Yes            1g        Take 1 g           Univer

s



     fatty      30                               by mouth           ity of



     acids-vitam      10:45:                               daily.           [place]

[place]



     in E (FISH      12                                                Medical



     OIL) 1,000                                                        Branch



     mg capsule                                                        







Immunizations







           Ordered    Filled Immunization Date       Status     Comments   [facility]

e



           Immunization Name Name                                        

 

           SARS-COV-2 COVID-19            2022 Completed             Unive

rsity of



           BRANDON/J&J VACCINE            00:00:00                         [facility]

 

           SARS-COV-2 COVID-19            2022 Completed             Unive

rsity of



           BRANDON/J&J VACCINE            00:00:00                         [facility]

 

           SARS-COV-2 COVID-19            2021 Completed             Unive

rsity of



           BRANDON/J&J VACCINE            00:00:00                         [facility]                  2014 Completed             [facility]



                                 00:00:00                         [facility]                  2014 Completed             [facility]



                                 00:00:00                         [facility]                  2014 Completed             [facility]



                                 00:00:00                         [facility]

 

           Td                    2003 Completed             [facility]



                                 00:00:00                         [facility]

 

           Td                    2003 Completed             [facility]



                                 00:00:00                         [facility]

 

           Td                    2003 Completed             [facility]



                                 00:00:00                         [facility]







Vital Signs







             Vital Name   Observation Time Observation Value Comments     Source

 

             Systolic blood 2021 00:06:46 142 mm[Hg]                Univer

sity of



             pressure                                            [facility]

 

             Diastolic blood 2021 00:06:46 94 mm[Hg]                 Unive

rsity of



             pressure                                            [facility]

 

             Heart rate   2021 00:05:40 80 /min                   [facility]

 

             Respiratory rate 2021 00:05:40 16 /min                   [facility]

 

             Oxygen saturation in 2021 00:05:40 99 /min                   

[facility]



             Arterial blood by                                        [place] [facility]

[place]



             Pulse oximetry                                        [employer]

 

             Body temperature 2021 21:56:00 37 Zara                    [facility]

 

             Body weight  2021 21:56:00 99.791 kg                 [facility]

 

             BMI          2021 21:56:00 36.61 kg/m2               [facility]







Procedures







                Procedure       Date / Time Performed Performing Clinician Jason portillo

 

                SARS-COV-2 COVID-19 2022 21:34:19 Doctor Unassigned, No Un

iversity of 

[place]



                VACCINE,0.5ML,IM                 Name            [facility]



                (BRANDON/J&J)                                   

 

                XR HIPS 2 VW LEFT 2021 23:00:50 Azra Galvez [facility]

 

                URINALYSIS      2021 22:28:00 Azra Galvez [facility]







Encounters







        Start   End     Encounter Admission Attending Care    Care    Encounter 

Source



        Date/Time Date/Time Type    Type    Clinicians Facility Department ID   

   

 

        2022 Outpatient R       STEPHAN  [facility]    [id number]

[id number] Univers



        16:30:00 16:55:17                 [facility]

 

        2022 Laboratory         Only, Arnulfo Pob2 Test [facility]    1.2

.840.114 [id number] 

Univers



        16:30:00 16:45:00 Only            James Hess 350.1.13

.10         ity [facility] [id number]         [place]

[place]



                                                PROFESSIO [phone]         Me

dical



                                                NAL     225             [facility]                 

 

        2022 Imm/Inj         Nurse, Arnulfo Pob Immunization [facility]  

  [id number] 

[id number]                                Univers



        15:40:00 15:40:00 Visit           James Hess 350.1.13

.10         ity [facility] 4.2.7.2.686         [place]

[place]



                                                PROFESSIO [phone]         Me

dical



                                                NAL     421             [facility]                 

 

        2022 Outpatient R       STEPHAN  [facility]    [id number]

[id number] Univers



        15:40:00 15:31:14                 JAMES                          [facility] [place] [facility]

 

        2021 Emergency         Duglas [facility]    [id number]

[id number] Univers



        15:58:00 18:12:00                 Azra Preciado 350.1.13.10        

 [facility] [id number]         [facility]  [phone]         Medi

warren



                                                        084             Branch

 

        2021 Emergency X       DUGLAS [facility]    ERT     [id number]

[id number] Univers



        15:58:00 18:12:00                 AZRA boateng [facility]







Results







           Test       Test       Test       Results    Result     Source



           Description Time       Comments              Comments   

 

           XR HIPS 2 VW 2021-              Impression: No evidence           

 University [place] [id number]                    of acute fracture or            [facility] [place] [facility]



                      23:33:55              dislocation. RL: [id number]            [facility]



                                            Electronically signed by            



                                            Jeremiah Doan MD at            



                                            3/3/2021 5:33 PMClinical            



                                            indication: Left hip            



                                            pain. Ordering            



                                            Physician: AZRA GALVEZ Technique: The            



                                            frontal view the pelvis            



                                            and 2 additional views            



                                            of the lefthip were            



                                            obtained. ?There are no            



                                            prior pelvic or left hip            



                                            imaging studiesavailable            



                                            for comparison.            



                                            Findings: There is no            



                                            evidence of an acute            



                                            fracture or            



                                            dislocationinvolving the            



                                            bones of the pelvis with            



                                            particular attention to            



                                            the lefthip. ?There is            



                                            no radiopaque soft            



                                            tissue foreign body.            



                                            [facility], Radiant Results            



                                            Inft User - 2021            



                                            5:34 PM CSTClinical            



                                            indication: Left hip            



                                            pain.Ordering Physician:            



                                            AZRA FLOWERLETechnique: The            



                                            frontal view the pelvis            



                                            and 2 additional views            



                                            of the lefthip were            



                                            obtained. There are no            



                                            prior pelvic or left hip            



                                            imaging studiesavailable            



                                            for comparison.Findings:            



                                            There is no evidence of            



                                            an acute fracture or            



                                            dislocationinvolving the            



                                            bones of the pelvis with            



                                            particular attention to            



                                            the lefthip. There is no            



                                            radiopaque soft tissue            



                                            foreign               



                                            body.IMPRESSIONImpressio            



                                            n:No evidence of acute            



                                            fracture or            



                                            dislocation.RL:            



                                            4507Electronically            



                                            signed by Jeremiah Doan MD at 3/3/2021 5:33 PM            









                    URINALYSIS          2021 22:52:00 









                      Test Item  Value      Reference Range Interpretation Comme

nts









             APPEARANCE (test code = Clear        Clear                     



             9769692632)                                         

 

             COLOR (test code = 4885999675) Yellow       Yellow                 

   

 

             PH (test code = 2837036189)              4.8-8.0                   

 

             SP GRAVITY (test code =              1.003-1.030  H            



             5066013607)                                         

 

             GLU U QUAL (test code = Normal       Normal                    



             8666199694)                                         

 

             BLOOD (test code = 8985583334) Negative     Negative               

   

 

             KETONES (test code = 2124261863) Negative     Negative             

     

 

             PROTEIN (test code = 2887-8) 30 mg/dL     Negative     A           

 

 

             UROBILIN (test code = 4.0 mg/dL    Normal       A            



             5686783241)                                         

 

             BILIRUBIN (test code = Negative     Negative                  



             4891342786)                                         

 

             NITRITE (test code = 7359398918) Negative     Negative             

     

 

             LEUK ESTEFANÍA (test code = Negative     Negative                  



             8386971732)                                         

 

             RBC/HPF (test code = 4180006063)              See_Comment  H       

      [Automated message] The



                                                                 system which ge

nerated this



                                                                 result transmit

shanelle reference



                                                                 range: 0 - 3 HP

F. The



                                                                 reference range

 was not used



                                                                 to interpret th

is result as



                                                                 normal/abnormal

.

 

             WBC/HPF (test code = 6695442276)              See_Comment          

      [Automated message] The



                                                                 system which ge

nerated this



                                                                 result transmit

shanelle reference



                                                                 range: 0 - 5 HP

F. The



                                                                 reference range

 was not used



                                                                 to interpret th

is result as



                                                                 normal/abnormal

.

 

             BACTERIA (test code = Negative     Negative                  



             7656103912)                                         

 

             MUCOUS (test code = 1567092908) Moderate     Negative LPF A        

    

 

             AMORPHOUS (test code = Rare         Rare HPF                  



             9845705161)                                         

 

             SQ EPITH (test code =              HPF                       



             3074092905)                                         

 

             Lab Interpretation (test code = Abnormal                           

    



             91277-5)                                            



[facility]

## 2022-12-10 NOTE — ER
Nurse's Notes                                                                                     

 [facility]                                                                 

Name: Chiquis Newberry                                                                              

Age: 47 yrs                                                                                       

Sex: Female                                                                                       

: 1975                                                                                   

MRN: [id number]                                                                                   

Arrival Date: 12/10/2022                                                                          

Time: 13:13                                                                                       

Account#: [id number]                                                                            

Bed DIS3                                                                                          

Private MD:                                                                                       

Diagnosis: Encounter for removal of sutures                                                       

                                                                                                  

Presentation:                                                                                     

12/10                                                                                             

14:00 Chief complaint: Patient states: needs sutures removed from left hand. Coronavirus      iw  

      screen: At this time, the client does not indicate any symptoms associated with             

      coronavirus-19. Ebola Screen: Patient negative for fever greater than or equal to 101.5     

      degrees Fahrenheit, and additional compatible Ebola Virus Disease symptoms Patient          

      denies exposure to infectious person. Patient denies travel to an Ebola-affected area       

      in the 21 days before illness onset. No symptoms or risks identified at this time.          

      Initial Sepsis Screen: Does the patient meet any 2 criteria? No. Patient's initial          

      sepsis screen is negative. Does the patient have a suspected source of infection? No.       

      Patient's initial sepsis screen is negative. Risk Assessment: Do you want to hurt           

      yourself or someone else? Patient reports no desire to harm self or others. Onset of        

      symptoms was December 10, 2022.                                                             

14:00 Method Of Arrival: Ambulatory                                                           iw  

14:00 Acuity: OMKAR 4                                                                           iw  

                                                                                                  

Historical:                                                                                       

- Allergies:                                                                                      

14:01 Codeine;                                                                                iw  

- PMHx:                                                                                           

14:01 chronic back pain;                                                                      iw  

- PSHx:                                                                                           

14:01 hysterectomy;                                                                           iw  

                                                                                                  

                                                                                                  

                                                                                                  

Vital Signs:                                                                                      

13:58  / 90 RA (/reg); Pulse 89; Resp 16; Temp 98.4(O); Pulse Ox 98% on R/A; Weight     zm  

      77.11 kg; Height 5 ft. 6 in. (167.64 cm); Pain 0/10;                                        

13:58 Body Mass Index 27.44 (77.11 kg, 167.64 cm)                                               

                                                                                                  

ED Course:                                                                                        

13:13 Patient arrived in ED.                                                                  am2 

13:15 [facility]                                                          snw 

13:15 Abraham Denise MD is Attending Physician.                                            snw 

14:01 Triage completed.                                                                       iw  

14:20 Dnaia Berry, RN is Primary Nurse.                                                   iw  

                                                                                                  

Administered Medications:                                                                         

No medications were administered                                                                  

                                                                                                  

                                                                                                  

Outcome:                                                                                          

14:12 Discharge ordered by MD.                                                                snw 

14:20 Patient left the ED.                                                                    iw  

                                                                                                  

Signatures:                                                                                       

Chandler, Abril, FNP-C                   FNP-Csnw                                                  

Dania Berry, RN                     RN   iw                                                   

Carolann Corral                               amDelores Sr                                                   

                                                                                                  

**************************************************************************************************